# Patient Record
Sex: FEMALE | Race: WHITE | Employment: OTHER | URBAN - METROPOLITAN AREA
[De-identification: names, ages, dates, MRNs, and addresses within clinical notes are randomized per-mention and may not be internally consistent; named-entity substitution may affect disease eponyms.]

---

## 2017-02-22 ENCOUNTER — TELEPHONE (OUTPATIENT)
Dept: FAMILY MEDICINE CLINIC | Facility: CLINIC | Age: 72
End: 2017-02-22

## 2017-02-22 NOTE — TELEPHONE ENCOUNTER
LMOM for pt to call back and schedule her Medicare Annual Well Visit w/Dr Branden Jon due after 8/12/17

## 2017-04-26 ENCOUNTER — HOSPITAL ENCOUNTER (OUTPATIENT)
Age: 72
Discharge: HOME | End: 2017-04-26
Attending: FAMILY MEDICINE
Payer: MEDICARE

## 2017-04-26 VITALS
HEIGHT: 61 IN | WEIGHT: 110 LBS | DIASTOLIC BLOOD PRESSURE: 85 MMHG | RESPIRATION RATE: 16 BRPM | SYSTOLIC BLOOD PRESSURE: 134 MMHG | BODY MASS INDEX: 20.77 KG/M2 | TEMPERATURE: 98 F | HEART RATE: 92 BPM | OXYGEN SATURATION: 98 %

## 2017-04-26 DIAGNOSIS — S46.819A TRAPEZIUS MUSCLE STRAIN, UNSPECIFIED LATERALITY, INITIAL ENCOUNTER: ICD-10-CM

## 2017-04-26 DIAGNOSIS — S09.90XA MINOR HEAD INJURY, INITIAL ENCOUNTER: Primary | ICD-10-CM

## 2017-04-26 PROCEDURE — 99213 OFFICE O/P EST LOW 20 MIN: CPT

## 2017-04-26 NOTE — ED PROVIDER NOTES
Patient Seen in: [address] [facility]    History   Patient presents with:  Head Injury    Stated Complaint: head injury / fell back striking head on glass wall mirrow while exercising    HPI  68-year-old female coming in with complaints of hav Relation Age of Onset   • pancreatic ca [Other] [OTHER] Father    • ischemic stroke [Other] [OTHER] Mother    • Cancer Father      Pancreatic age 80         Smoking Status: Never Smoker                      Smokeless Status: Never Used auscultation bilaterally  CV: S1 S2 heard, no mrg   Abdomen: soft, non-tender; bowel sounds normal; no masses,  no organomegaly  Extremities: extremities normal, atraumatic, no cyanosis or edema  Pulses: 2+ and symmetric  Skin: Skin color, texture, turgor week  As needed      Medications Prescribed:  Discharge Medication List as of 4/26/2017  3:28 PM

## 2017-04-26 NOTE — ED INITIAL ASSESSMENT (HPI)
Pt states she was working out on a balance ball when her feet came out from under her, pt states she hit her head of the mirror. Denies LOC, no trauma noted, pt c/o pressure to her lower head.  No visual distubances

## 2017-05-08 ENCOUNTER — OFFICE VISIT (OUTPATIENT)
Dept: FAMILY MEDICINE CLINIC | Facility: CLINIC | Age: 72
End: 2017-05-08

## 2017-05-08 VITALS
RESPIRATION RATE: 16 BRPM | SYSTOLIC BLOOD PRESSURE: 122 MMHG | HEART RATE: 80 BPM | BODY MASS INDEX: 21.23 KG/M2 | HEIGHT: 60.75 IN | WEIGHT: 111 LBS | DIASTOLIC BLOOD PRESSURE: 70 MMHG

## 2017-05-08 DIAGNOSIS — I65.23 BILATERAL CAROTID ARTERY STENOSIS: ICD-10-CM

## 2017-05-08 DIAGNOSIS — E78.2 MIXED HYPERLIPIDEMIA: ICD-10-CM

## 2017-05-08 DIAGNOSIS — M54.50 CHRONIC BILATERAL LOW BACK PAIN WITHOUT SCIATICA: ICD-10-CM

## 2017-05-08 DIAGNOSIS — E04.1 NONTOXIC UNINODULAR GOITER: Primary | ICD-10-CM

## 2017-05-08 DIAGNOSIS — G89.29 CHRONIC BILATERAL LOW BACK PAIN WITHOUT SCIATICA: ICD-10-CM

## 2017-05-08 PROCEDURE — 99214 OFFICE O/P EST MOD 30 MIN: CPT | Performed by: FAMILY MEDICINE

## 2017-05-08 RX ORDER — LEVOTHYROXINE SODIUM 0.05 MG/1
50 TABLET ORAL DAILY
Qty: 90 TABLET | Refills: 3 | Status: SHIPPED | OUTPATIENT
Start: 2017-05-08 | End: 2018-03-28

## 2017-05-08 NOTE — PROGRESS NOTES
Patient presents with:  Low Back Pain      HPI:   This is a 66year old female coming in for back pain. Crack while doing class, hit head on mirror when back popped, no LOC, mild nexk stiffness since then.  Still some back pain    HPI     She  reports that Normal rate, regular rhythm, S1 normal, S2 normal and normal heart sounds. No murmur heard. Pulses:       Posterior tibial pulses are 2+ on the right side, and 2+ on the left side. Edema not present.   Pulmonary/Chest: Effort normal and breath sounds

## 2017-05-09 NOTE — ASSESSMENT & PLAN NOTE
Stable, Continue present management.     Thyroid  (most recent labs)     Lab Results  Component Value Date/Time   TSH 0.251* 10/07/2015 09:22 AM   T4F 1.3 10/07/2015 09:22 AM         Endocrine Medications          Levothyroxine Sodium 50 MCG Oral Tab

## 2017-07-12 ENCOUNTER — LAB ENCOUNTER (OUTPATIENT)
Dept: LAB | Age: 72
End: 2017-07-12
Attending: FAMILY MEDICINE
Payer: MEDICARE

## 2017-07-12 ENCOUNTER — HOSPITAL ENCOUNTER (OUTPATIENT)
Dept: ULTRASOUND IMAGING | Age: 72
Discharge: HOME | End: 2017-07-12
Attending: NURSE PRACTITIONER
Payer: MEDICARE

## 2017-07-12 ENCOUNTER — HOSPITAL ENCOUNTER (OUTPATIENT)
Dept: ULTRASOUND IMAGING | Age: 72
Discharge: HOME | End: 2017-07-12
Attending: FAMILY MEDICINE
Payer: MEDICARE

## 2017-07-12 DIAGNOSIS — E78.2 MIXED HYPERLIPIDEMIA: ICD-10-CM

## 2017-07-12 DIAGNOSIS — E04.1 NONTOXIC UNINODULAR GOITER: ICD-10-CM

## 2017-07-12 DIAGNOSIS — K76.0 FATTY LIVER: ICD-10-CM

## 2017-07-12 LAB
ALBUMIN SERPL-MCNC: 4.2 G/DL (ref 3.5–4.8)
ALP LIVER SERPL-CCNC: 88 U/L (ref 55–142)
ALT SERPL-CCNC: 29 U/L (ref 14–54)
AST SERPL-CCNC: 22 U/L (ref 15–41)
BASOPHILS # BLD AUTO: 0.04 X10(3) UL (ref 0–0.1)
BASOPHILS NFR BLD AUTO: 0.8 %
BILIRUB SERPL-MCNC: 0.8 MG/DL (ref 0.1–2)
BUN BLD-MCNC: 15 MG/DL (ref 8–20)
CALCIUM BLD-MCNC: 9.6 MG/DL (ref 8.3–10.3)
CHLORIDE: 106 MMOL/L (ref 101–111)
CO2: 28 MMOL/L (ref 22–32)
CREAT BLD-MCNC: 0.91 MG/DL (ref 0.55–1.02)
EOSINOPHIL # BLD AUTO: 0.19 X10(3) UL (ref 0–0.3)
EOSINOPHIL NFR BLD AUTO: 3.7 %
ERYTHROCYTE [DISTWIDTH] IN BLOOD BY AUTOMATED COUNT: 12.3 % (ref 11.5–16)
FREE T4: 1.1 NG/DL (ref 0.9–1.8)
GLUCOSE BLD-MCNC: 88 MG/DL (ref 70–99)
HCT VFR BLD AUTO: 43.7 % (ref 34–50)
HGB BLD-MCNC: 13.9 G/DL (ref 12–16)
IMMATURE GRANULOCYTE COUNT: 0.01 X10(3) UL (ref 0–1)
IMMATURE GRANULOCYTE RATIO %: 0.2 %
LYMPHOCYTES # BLD AUTO: 1.65 X10(3) UL (ref 0.9–4)
LYMPHOCYTES NFR BLD AUTO: 32 %
M PROTEIN MFR SERPL ELPH: 8.3 G/DL (ref 6.1–8.3)
MCH RBC QN AUTO: 30 PG (ref 27–33.2)
MCHC RBC AUTO-ENTMCNC: 31.8 G/DL (ref 31–37)
MCV RBC AUTO: 94.4 FL (ref 81–100)
MONOCYTES # BLD AUTO: 0.54 X10(3) UL (ref 0.1–0.6)
MONOCYTES NFR BLD AUTO: 10.5 %
NEUTROPHIL ABS PRELIM: 2.72 X10 (3) UL (ref 1.3–6.7)
NEUTROPHILS # BLD AUTO: 2.72 X10(3) UL (ref 1.3–6.7)
NEUTROPHILS NFR BLD AUTO: 52.8 %
PLATELET # BLD AUTO: 300 10(3)UL (ref 150–450)
POTASSIUM SERPL-SCNC: 4.4 MMOL/L (ref 3.6–5.1)
RBC # BLD AUTO: 4.63 X10(6)UL (ref 3.8–5.1)
RED CELL DISTRIBUTION WIDTH-SD: 42.5 FL (ref 35.1–46.3)
SODIUM SERPL-SCNC: 140 MMOL/L (ref 136–144)
TSI SER-ACNC: 1.04 MIU/ML (ref 0.35–5.5)
WBC # BLD AUTO: 5.2 X10(3) UL (ref 4–13)

## 2017-07-12 PROCEDURE — 84439 ASSAY OF FREE THYROXINE: CPT

## 2017-07-12 PROCEDURE — 76536 US EXAM OF HEAD AND NECK: CPT | Performed by: FAMILY MEDICINE

## 2017-07-12 PROCEDURE — 84443 ASSAY THYROID STIM HORMONE: CPT

## 2017-07-12 PROCEDURE — 85025 COMPLETE CBC W/AUTO DIFF WBC: CPT

## 2017-07-12 PROCEDURE — 36415 COLL VENOUS BLD VENIPUNCTURE: CPT

## 2017-07-12 PROCEDURE — 76700 US EXAM ABDOM COMPLETE: CPT | Performed by: NURSE PRACTITIONER

## 2017-07-12 PROCEDURE — 80053 COMPREHEN METABOLIC PANEL: CPT

## 2017-07-14 ENCOUNTER — TELEPHONE (OUTPATIENT)
Dept: FAMILY MEDICINE CLINIC | Facility: CLINIC | Age: 72
End: 2017-07-14

## 2017-07-14 DIAGNOSIS — N28.1 COMPLEX RENAL CYST: Primary | ICD-10-CM

## 2017-07-14 NOTE — TELEPHONE ENCOUNTER
US showed increasing renal cyst, complex, so CT to evaluate    I sent a my chart message, but please call to make sure she knows that she needs to do the CT.     Diagnoses and all orders for this visit:    Complex renal cyst  -     CT ABDOMEN (W+WO) (CPT=74

## 2017-07-15 ENCOUNTER — MED REC SCAN ONLY (OUTPATIENT)
Dept: FAMILY MEDICINE CLINIC | Facility: CLINIC | Age: 72
End: 2017-07-15

## 2017-07-17 ENCOUNTER — TELEPHONE (OUTPATIENT)
Dept: FAMILY MEDICINE CLINIC | Facility: CLINIC | Age: 72
End: 2017-07-17

## 2017-07-17 NOTE — TELEPHONE ENCOUNTER
KIDNEYS:  Bilateral renal cysts measuring up to 8 mm on the right and 3.6 cm on the left. The left renal cyst is mildly complex measuring 3.2 x 2 x 3.6 cm. It is located at the superior aspect of the left kidney.  This previously measured 2.9 x 2.6 x 2.8

## 2017-07-17 NOTE — TELEPHONE ENCOUNTER
Patient is calling she was told to have further imaging [facility] Dr. Jordyn Fung recommended that she discuss this Dr. Morse , re: cyst on left kidney. Please call to discuss.

## 2017-07-17 NOTE — TELEPHONE ENCOUNTER
Called patient and told her Dr Shane Young had already odered a CT scan for her to get done she stated she cookie do this ASAP

## 2017-07-17 NOTE — TELEPHONE ENCOUNTER
Please see telephone note from 7/14 and discontinue this as it is more completely identified at that time including the CTA ordered and the my chart message I had sent to patient through that message

## 2017-07-21 ENCOUNTER — HOSPITAL ENCOUNTER (OUTPATIENT)
Dept: CT IMAGING | Age: 72
Discharge: HOME | End: 2017-07-21
Attending: FAMILY MEDICINE
Payer: MEDICARE

## 2017-07-21 DIAGNOSIS — N28.1 COMPLEX RENAL CYST: ICD-10-CM

## 2017-07-21 PROCEDURE — 74170 CT ABD WO CNTRST FLWD CNTRST: CPT | Performed by: FAMILY MEDICINE

## 2017-07-23 PROBLEM — I70.0 ATHEROSCLEROSIS OF AORTA: Status: ACTIVE | Noted: 2017-07-23

## 2017-07-23 PROBLEM — I70.0 ATHEROSCLEROSIS OF AORTA (HCC): Status: ACTIVE | Noted: 2017-07-23

## 2017-08-14 ENCOUNTER — TELEPHONE (OUTPATIENT)
Dept: FAMILY MEDICINE CLINIC | Facility: CLINIC | Age: 72
End: 2017-08-14

## 2017-08-14 NOTE — TELEPHONE ENCOUNTER
LMOM for pt to either call back or come in 15 min prior to answer Annual Assessment form for appt on 8/18/17 w/Dr Davis (form by daily checklist)

## 2017-08-18 ENCOUNTER — OFFICE VISIT (OUTPATIENT)
Dept: FAMILY MEDICINE CLINIC | Facility: CLINIC | Age: 72
End: 2017-08-18

## 2017-08-18 VITALS
BODY MASS INDEX: 20.39 KG/M2 | HEART RATE: 68 BPM | HEIGHT: 61 IN | WEIGHT: 108 LBS | TEMPERATURE: 98 F | DIASTOLIC BLOOD PRESSURE: 68 MMHG | SYSTOLIC BLOOD PRESSURE: 114 MMHG | RESPIRATION RATE: 14 BRPM

## 2017-08-18 DIAGNOSIS — I65.23 BILATERAL CAROTID ARTERY STENOSIS: ICD-10-CM

## 2017-08-18 DIAGNOSIS — E04.1 NONTOXIC UNINODULAR GOITER: ICD-10-CM

## 2017-08-18 DIAGNOSIS — Z13.39 SCREENING FOR ALCOHOL PROBLEM: ICD-10-CM

## 2017-08-18 DIAGNOSIS — Z00.00 ANNUAL PHYSICAL EXAM: Primary | ICD-10-CM

## 2017-08-18 DIAGNOSIS — E78.2 MIXED HYPERLIPIDEMIA: ICD-10-CM

## 2017-08-18 DIAGNOSIS — Z13.31 DEPRESSION SCREENING: ICD-10-CM

## 2017-08-18 DIAGNOSIS — Z12.31 VISIT FOR SCREENING MAMMOGRAM: ICD-10-CM

## 2017-08-18 DIAGNOSIS — Z00.00 ENCOUNTER FOR ANNUAL HEALTH EXAMINATION: ICD-10-CM

## 2017-08-18 DIAGNOSIS — I70.0 ATHEROSCLEROSIS OF AORTA (HCC): ICD-10-CM

## 2017-08-18 PROCEDURE — G0444 DEPRESSION SCREEN ANNUAL: HCPCS | Performed by: FAMILY MEDICINE

## 2017-08-18 PROCEDURE — G0439 PPPS, SUBSEQ VISIT: HCPCS | Performed by: FAMILY MEDICINE

## 2017-08-18 PROCEDURE — G0442 ANNUAL ALCOHOL SCREEN 15 MIN: HCPCS | Performed by: FAMILY MEDICINE

## 2017-08-18 NOTE — PROGRESS NOTES
HPI:   Cheri Elias is a 66year old female who presents for a Medicare Subsequent Annual Wellness visit (Pt already had Initial Annual Wellness).     Rash on arms, cataracts, stable renal cyst.   Her last annual assessment has been over 1 year: Annual Phy gi endoscopy,biopsy (10/3/14); colonoscopy; and . Her family history includes Cancer in her father; ischemic stroke in her mother; pancreatic ca in her father. SOCIAL HISTORY:   She  reports that she has never smoked.  She has never used smokeless normal. Neck supple. Normal carotid pulses present. No tracheal tenderness present. No edema present. Thyroid mass and thyromegaly present. Cardiovascular: Normal rate, regular rhythm, S1 normal, S2 normal, normal heart sounds and intact distal pulses. 143, diet controlled         Current Assessment & Plan     Stable, Continue present management.       Discussed options, declined statin         Atherosclerosis of aorta (HCC)    Overview     Mild atherosclerosis of aorta seen on CT 7/2017          Current week)    How would you describe your daily physical activity?: Moderate    How would you describe your current health state?: Good    How do you maintain positive mental well-being?: Social Interaction;Puzzles; Visiting Friends; Visiting Family (aerobics) \"Cognitive Assessment\" under Medicare Assessment section in Charting, test patient and document. Then, refresh your progress note to see your input here.   Cognitive Assessment     What day of the week is this?: Correct    What month is it?: Correct are no preventive care reminders to display for this patient. Update Health Maintenance if applicable    Chlamydia  Annually if high risk No results found for: CHLAMYDIA No flowsheet data found.     Screening Mammogram      Mammogram  Annually to 76, then a LDL Cholesterol (mg/dL)   Date Value   04/02/2015 143 (H)    No flowsheet data found. Dilated Eye exam  Annually Data entered on: 11/14/2014   Last Dilated Eye Exam 11/14/2014     No flowsheet data found.     COPD      Spirometry Testing Annually Sp

## 2017-08-18 NOTE — PATIENT INSTRUCTIONS
Janet Lantigua's SCREENING SCHEDULE   Tests on this list are recommended by your physician but may not be covered, or covered at this frequency, by your insurer. Please check with your insurance carrier before scheduling to verify coverage.    PREVENTATIVE aortic aneurysm screening (once between ages 73-68) IPPE only No results found for this or any previous visit.  Limited to patients who meet one of the following criteria:   • Men who are 73-68 years old and have smoked more than 100 cigarettes in their lif Mammogram    Recommend Annually to at least age 76, and as needed after 76 Mammogram,1 Yr due on 11/21/1983 Please get this Mammogram regularly   Immunizations      Influenza  Covered Annually   Orders placed or performed in visit on 10/10/13  -Filipe Page and print using their home computer and printer. (the forms are also available in [place]  [email or web address]  This link also has information from the [facility] regarding Advance Directives.

## 2017-08-24 ENCOUNTER — HOSPITAL ENCOUNTER (OUTPATIENT)
Dept: MAMMOGRAPHY | Age: 72
Discharge: HOME | End: 2017-08-24
Attending: FAMILY MEDICINE
Payer: MEDICARE

## 2017-08-24 DIAGNOSIS — Z12.31 VISIT FOR SCREENING MAMMOGRAM: ICD-10-CM

## 2017-08-24 PROCEDURE — 77067 SCR MAMMO BI INCL CAD: CPT | Performed by: FAMILY MEDICINE

## 2017-11-29 ENCOUNTER — LAB ENCOUNTER (OUTPATIENT)
Dept: LAB | Age: 72
End: 2017-11-29
Attending: INTERNAL MEDICINE
Payer: MEDICARE

## 2017-11-29 DIAGNOSIS — K76.0 FATTY LIVER: ICD-10-CM

## 2017-11-29 PROCEDURE — 36415 COLL VENOUS BLD VENIPUNCTURE: CPT

## 2017-11-29 PROCEDURE — 80053 COMPREHEN METABOLIC PANEL: CPT

## 2017-11-29 PROCEDURE — 85025 COMPLETE CBC W/AUTO DIFF WBC: CPT

## 2017-12-06 PROBLEM — H25.813 COMBINED FORM OF AGE-RELATED CATARACT, BOTH EYES: Status: ACTIVE | Noted: 2017-12-06

## 2018-01-12 ENCOUNTER — TELEPHONE (OUTPATIENT)
Dept: FAMILY MEDICINE CLINIC | Facility: CLINIC | Age: 73
End: 2018-01-12

## 2018-01-12 RX ORDER — POLYMYXIN B SULFATE AND TRIMETHOPRIM 1; 10000 MG/ML; [USP'U]/ML
1 SOLUTION OPHTHALMIC EVERY 4 HOURS
Qty: 10 ML | Refills: 0 | Status: SHIPPED | OUTPATIENT
Start: 2018-01-12 | End: 2018-01-17

## 2018-01-12 NOTE — TELEPHONE ENCOUNTER
C/o irritation starting Wednesday in Left eye sclera was pink with drainage she had Polymyxin B-Trimethoprim 02070-9.1 UNIT/ML-% Ophthalmic Solution left over and started using it but was doing every 3 hours not every 6 and has run out she would like a ref

## 2018-01-12 NOTE — TELEPHONE ENCOUNTER
Ok,   Signed Prescriptions Disp Refills    Polymyxin B-Trimethoprim 05306-3.1 UNIT/ML-% Ophthalmic Solution 10 mL 0      Sig: Place 1 drop into both eyes every 4 (four) hours.         Authorizing Provider: Bridgette Ramires

## 2018-01-12 NOTE — TELEPHONE ENCOUNTER
Patient is in [facility] and has pink eye and needs a sciprt sent to [facility].  [facility] [place] [address], [address] [facility] [phone], [phone]

## 2018-03-21 ENCOUNTER — TELEPHONE (OUTPATIENT)
Dept: FAMILY MEDICINE CLINIC | Facility: CLINIC | Age: 73
End: 2018-03-21

## 2018-03-21 NOTE — TELEPHONE ENCOUNTER
Patient requesting a refill on Levothyroxine Sodium 50 MCG Oral Tab sent to [facility].  Patient is scheduled for 3/28/2018 with Dr. Karla Leyden

## 2018-03-21 NOTE — TELEPHONE ENCOUNTER
Should have enough medication to last until appointment was refilled in 5/8/2017 for 90 +3 RF has appointment for 3/28/2018 with Dr Lendia Halon called and talked to patient she has enough meds to last until her appointment

## 2018-03-28 ENCOUNTER — OFFICE VISIT (OUTPATIENT)
Dept: FAMILY MEDICINE CLINIC | Facility: CLINIC | Age: 73
End: 2018-03-28

## 2018-03-28 ENCOUNTER — TELEPHONE (OUTPATIENT)
Dept: FAMILY MEDICINE CLINIC | Facility: CLINIC | Age: 73
End: 2018-03-28

## 2018-03-28 VITALS
RESPIRATION RATE: 12 BRPM | HEART RATE: 64 BPM | WEIGHT: 107 LBS | HEIGHT: 62 IN | SYSTOLIC BLOOD PRESSURE: 126 MMHG | TEMPERATURE: 97 F | BODY MASS INDEX: 19.69 KG/M2 | DIASTOLIC BLOOD PRESSURE: 80 MMHG

## 2018-03-28 DIAGNOSIS — E04.1 NONTOXIC UNINODULAR GOITER: ICD-10-CM

## 2018-03-28 DIAGNOSIS — E55.9 VITAMIN D DEFICIENCY: ICD-10-CM

## 2018-03-28 DIAGNOSIS — I70.0 ATHEROSCLEROSIS OF AORTA (HCC): ICD-10-CM

## 2018-03-28 DIAGNOSIS — Z00.00 LABORATORY EXAMINATION ORDERED AS PART OF A ROUTINE GENERAL MEDICAL EXAMINATION: ICD-10-CM

## 2018-03-28 DIAGNOSIS — R73.9 HYPERGLYCEMIA: ICD-10-CM

## 2018-03-28 DIAGNOSIS — E78.2 MIXED HYPERLIPIDEMIA: Primary | ICD-10-CM

## 2018-03-28 DIAGNOSIS — Z13.220 SCREENING FOR LIPID DISORDERS: ICD-10-CM

## 2018-03-28 PROCEDURE — 99214 OFFICE O/P EST MOD 30 MIN: CPT | Performed by: FAMILY MEDICINE

## 2018-03-28 RX ORDER — LEVOTHYROXINE SODIUM 0.05 MG/1
50 TABLET ORAL DAILY
Qty: 90 TABLET | Refills: 3 | Status: SHIPPED | OUTPATIENT
Start: 2018-03-28 | End: 2018-04-18

## 2018-03-28 RX ORDER — LEVOTHYROXINE SODIUM 0.05 MG/1
50 TABLET ORAL DAILY
Qty: 90 TABLET | Refills: 3 | Status: SHIPPED | OUTPATIENT
Start: 2018-03-28 | End: 2018-03-28

## 2018-03-28 NOTE — PROGRESS NOTES
Patient presents with:  Hyperlipidemia: 6 month f/u   Thyroid Cancer: medication refill      Subjective   HPI:   This is a 74year old female coming in for cholesterol and thyroid follow up, labs due.  Lost weight in Decemebr    HPI     She  reports that sh Psychiatric: She has a normal mood and affect.  Judgment and thought content normal.        Assessment      Problem List Items Addressed This Visit        Cardiovascular    Mixed hyperlipidemia - Primary    Overview     , diet controlled         Curr

## 2018-03-28 NOTE — ASSESSMENT & PLAN NOTE
Stable, Continue present management.     Thyroid  (most recent labs)     Lab Results  Component Value Date/Time   TSH 1.040 07/12/2017 08:02 AM   T4F 1.1 07/12/2017 08:02 AM         Endocrine Medications          Levothyroxine Sodium 50 MCG Oral Tab

## 2018-04-18 ENCOUNTER — TELEPHONE (OUTPATIENT)
Dept: FAMILY MEDICINE CLINIC | Facility: CLINIC | Age: 73
End: 2018-04-18

## 2018-04-18 DIAGNOSIS — E04.1 NONTOXIC UNINODULAR GOITER: ICD-10-CM

## 2018-04-18 RX ORDER — LEVOTHYROXINE SODIUM 0.05 MG/1
50 TABLET ORAL DAILY
Qty: 90 TABLET | Refills: 3 | Status: SHIPPED | OUTPATIENT
Start: 2018-04-18 | End: 2019-04-03

## 2018-04-18 NOTE — TELEPHONE ENCOUNTER
Called and talked to patient she can get it cheaper at [address] then at the mail order med resent to [facility]

## 2018-05-10 ENCOUNTER — APPOINTMENT (OUTPATIENT)
Dept: LAB | Age: 73
End: 2018-05-10
Attending: FAMILY MEDICINE
Payer: MEDICARE

## 2018-05-10 DIAGNOSIS — Z13.220 SCREENING FOR LIPID DISORDERS: ICD-10-CM

## 2018-05-10 DIAGNOSIS — E55.9 VITAMIN D DEFICIENCY: ICD-10-CM

## 2018-05-10 DIAGNOSIS — Z00.00 LABORATORY EXAMINATION ORDERED AS PART OF A ROUTINE GENERAL MEDICAL EXAMINATION: ICD-10-CM

## 2018-05-10 DIAGNOSIS — R73.9 HYPERGLYCEMIA: ICD-10-CM

## 2018-05-10 DIAGNOSIS — E04.1 NONTOXIC UNINODULAR GOITER: ICD-10-CM

## 2018-05-10 PROCEDURE — 82306 VITAMIN D 25 HYDROXY: CPT

## 2018-05-10 PROCEDURE — 84439 ASSAY OF FREE THYROXINE: CPT

## 2018-05-10 PROCEDURE — 80053 COMPREHEN METABOLIC PANEL: CPT

## 2018-05-10 PROCEDURE — 83036 HEMOGLOBIN GLYCOSYLATED A1C: CPT

## 2018-05-10 PROCEDURE — 80061 LIPID PANEL: CPT

## 2018-05-10 PROCEDURE — 84443 ASSAY THYROID STIM HORMONE: CPT

## 2018-08-14 ENCOUNTER — TELEPHONE (OUTPATIENT)
Dept: FAMILY MEDICINE CLINIC | Facility: CLINIC | Age: 73
End: 2018-08-14

## 2018-08-14 NOTE — TELEPHONE ENCOUNTER
LM with patients  asking if he can have patient contact us to complete annual health assessment form.

## 2018-08-20 ENCOUNTER — OFFICE VISIT (OUTPATIENT)
Dept: FAMILY MEDICINE CLINIC | Facility: CLINIC | Age: 73
End: 2018-08-20
Payer: MEDICARE

## 2018-08-20 VITALS
TEMPERATURE: 98 F | RESPIRATION RATE: 12 BRPM | HEIGHT: 61 IN | DIASTOLIC BLOOD PRESSURE: 70 MMHG | SYSTOLIC BLOOD PRESSURE: 120 MMHG | WEIGHT: 104 LBS | BODY MASS INDEX: 19.63 KG/M2 | HEART RATE: 88 BPM

## 2018-08-20 DIAGNOSIS — R29.890 LOSS OF HEIGHT: ICD-10-CM

## 2018-08-20 DIAGNOSIS — I70.0 ATHEROSCLEROSIS OF AORTA (HCC): ICD-10-CM

## 2018-08-20 DIAGNOSIS — E04.1 NONTOXIC UNINODULAR GOITER: ICD-10-CM

## 2018-08-20 DIAGNOSIS — I77.9 BILATERAL CAROTID ARTERY DISEASE, UNSPECIFIED TYPE (HCC): ICD-10-CM

## 2018-08-20 DIAGNOSIS — Z00.00 ANNUAL PHYSICAL EXAM: Primary | ICD-10-CM

## 2018-08-20 DIAGNOSIS — Z12.31 VISIT FOR SCREENING MAMMOGRAM: ICD-10-CM

## 2018-08-20 DIAGNOSIS — E78.2 MIXED HYPERLIPIDEMIA: ICD-10-CM

## 2018-08-20 DIAGNOSIS — Z00.00 ENCOUNTER FOR ANNUAL HEALTH EXAMINATION: ICD-10-CM

## 2018-08-20 PROCEDURE — 99213 OFFICE O/P EST LOW 20 MIN: CPT | Performed by: FAMILY MEDICINE

## 2018-08-20 PROCEDURE — G0439 PPPS, SUBSEQ VISIT: HCPCS | Performed by: FAMILY MEDICINE

## 2018-08-20 NOTE — PROGRESS NOTES
HPI:   Yola Thompson is a 74year old female who presents for a Medicare Subsequent Annual Wellness visit (Pt already had Initial Annual Wellness). Doing well overall.    Her last annual assessment has been over 1 year: Annual Physical due on 08/18/2018 disease ([address]     Atherosclerosis of aorta ([address]     Combined form of age-related cataract, both eyes    Wt Readings from Last 3 Encounters:  08/20/18 : 104 lb  03/28/18 : 107 lb  08/18/17 : 108 lb     Last Cholesterol Labs:     Lab Results  Component Valu Negative. Negative for abdominal pain. Endocrine: Negative. Genitourinary: Negative. Negative for dysuria and difficulty urinating. Musculoskeletal: Negative for joint swelling. Skin: Negative. Negative for rash. Neurological: Negative.   Leandrew Overall murmur heard. Edema not present. Carotid bruit not present. Pulmonary/Chest: Effort normal and breath sounds normal. No respiratory distress. She has no decreased breath sounds. She has no wheezes. She has no rales. She exhibits no tenderness.    Abdomina diet controlled         Current Assessment & Plan     Stable, Continue present management. Atherosclerosis of aorta (HCC)    Overview     Mild atherosclerosis of aorta seen on CT 7/2017          Current Assessment & Plan     Stable.  Continue patient  PREVENTATIVE SERVICES  INDICATIONS AND SCHEDULE Internal Lab or Procedure External Lab or Procedure   Diabetes Screening      HbgA1C   Annually HgbA1C (%)   Date Value   05/10/2018 5.3    No flowsheet data found.     Fasting Blood Sugar (FSB)Annual Annually 11/10/2017 Please get every year    Pneumococcal 13 (Prevnar)  Covered Once after 65 08/12/2016 Please get once after your 65th birthday    Pneumococcal 23 (Pneumovax)  Covered Once after 65 10/10/2013 Please get once after your 65th birthday    H

## 2018-08-21 NOTE — ASSESSMENT & PLAN NOTE
Stable, Continue present management.     Thyroid  (most recent labs)     Lab Results  Component Value Date/Time   TSH 0.475 05/10/2018 09:11 AM   T4F 1.2 05/10/2018 09:11 AM         Endocrine Medications          Levothyroxine Sodium 50 MCG Oral Tab

## 2018-11-29 ENCOUNTER — LAB ENCOUNTER (OUTPATIENT)
Dept: LAB | Age: 73
End: 2018-11-29
Attending: INTERNAL MEDICINE
Payer: MEDICARE

## 2018-11-29 DIAGNOSIS — K76.0 FATTY LIVER: ICD-10-CM

## 2018-11-29 PROCEDURE — 80053 COMPREHEN METABOLIC PANEL: CPT

## 2018-11-29 PROCEDURE — 36415 COLL VENOUS BLD VENIPUNCTURE: CPT

## 2018-11-29 PROCEDURE — 85025 COMPLETE CBC W/AUTO DIFF WBC: CPT

## 2019-04-03 ENCOUNTER — OFFICE VISIT (OUTPATIENT)
Dept: FAMILY MEDICINE CLINIC | Facility: CLINIC | Age: 74
End: 2019-04-03
Payer: MEDICARE

## 2019-04-03 VITALS
TEMPERATURE: 97 F | RESPIRATION RATE: 14 BRPM | WEIGHT: 103.19 LBS | SYSTOLIC BLOOD PRESSURE: 122 MMHG | DIASTOLIC BLOOD PRESSURE: 70 MMHG | HEART RATE: 68 BPM | BODY MASS INDEX: 19.74 KG/M2 | HEIGHT: 60.75 IN

## 2019-04-03 DIAGNOSIS — N95.1 MENOPAUSAL SYMPTOMS: ICD-10-CM

## 2019-04-03 DIAGNOSIS — E04.1 NONTOXIC UNINODULAR GOITER: ICD-10-CM

## 2019-04-03 DIAGNOSIS — I70.0 ATHEROSCLEROSIS OF AORTA (HCC): ICD-10-CM

## 2019-04-03 DIAGNOSIS — E78.2 MIXED HYPERLIPIDEMIA: Primary | ICD-10-CM

## 2019-04-03 DIAGNOSIS — Z12.31 VISIT FOR SCREENING MAMMOGRAM: ICD-10-CM

## 2019-04-03 DIAGNOSIS — I77.9 BILATERAL CAROTID ARTERY DISEASE, UNSPECIFIED TYPE (HCC): ICD-10-CM

## 2019-04-03 DIAGNOSIS — Z00.00 LABORATORY EXAMINATION ORDERED AS PART OF A ROUTINE GENERAL MEDICAL EXAMINATION: ICD-10-CM

## 2019-04-03 DIAGNOSIS — Z78.0 ASYMPTOMATIC MENOPAUSAL STATE: ICD-10-CM

## 2019-04-03 PROCEDURE — 99213 OFFICE O/P EST LOW 20 MIN: CPT | Performed by: FAMILY MEDICINE

## 2019-04-03 RX ORDER — LEVOTHYROXINE SODIUM 0.05 MG/1
50 TABLET ORAL DAILY
Qty: 90 TABLET | Refills: 3 | Status: SHIPPED | OUTPATIENT
Start: 2019-04-03 | End: 2020-04-09

## 2019-04-03 NOTE — PATIENT INSTRUCTIONS
Stye treatment:    Baby shampoo in a leslie cup with a little warm water. Use Q-tip to apply it to the eyelid to open the pore, up to 3 times a day. Warm compresses afterwards as needed. Kisha Morel

## 2019-04-03 NOTE — PROGRESS NOTES
Patient presents with:  Thyroid Problem: medication f/u       HPI:   This is a 74year old female coming in for hypothyroidism and is here for a recheck. Patient is currently taking 50 mcg. Patient has been tolerating the medication well.  Patient was las nodules palpated  LUNGS: CTA  CARDIO: RRR without murmur  PSYCH:  Mood appropriate  EXTREMITIES: no cyanosis, clubbing or edema    ASSESSMENT AND PLAN:     Problem List Items Addressed This Visit        Cardiovascular    Mixed hyperlipidemia - Primary    R

## 2019-05-23 ENCOUNTER — HOSPITAL ENCOUNTER (OUTPATIENT)
Dept: BONE DENSITY | Age: 74
Discharge: HOME | End: 2019-05-23
Attending: FAMILY MEDICINE
Payer: MEDICARE

## 2019-05-23 DIAGNOSIS — N95.1 MENOPAUSAL SYMPTOMS: ICD-10-CM

## 2019-05-23 DIAGNOSIS — Z78.0 ASYMPTOMATIC MENOPAUSAL STATE: ICD-10-CM

## 2019-05-23 PROCEDURE — 77080 DXA BONE DENSITY AXIAL: CPT | Performed by: FAMILY MEDICINE

## 2019-05-23 NOTE — PROGRESS NOTES
Your bone density has stayed about the same. Continue your current treatment measures and repeat the Bone Density Dexa Scan in 2 years.

## 2019-06-07 PROBLEM — K63.5 POLYP OF COLON: Status: ACTIVE | Noted: 2019-06-07

## 2019-06-07 PROBLEM — D12.2 BENIGN NEOPLASM OF ASCENDING COLON: Status: ACTIVE | Noted: 2019-06-07

## 2019-06-07 PROBLEM — Z86.010 PERSONAL HISTORY OF COLONIC POLYPS: Status: ACTIVE | Noted: 2019-06-07

## 2019-06-07 PROBLEM — K64.8 OTHER HEMORRHOIDS: Status: ACTIVE | Noted: 2019-06-07

## 2019-06-07 PROBLEM — Z86.0100 PERSONAL HISTORY OF COLONIC POLYPS: Status: ACTIVE | Noted: 2019-06-07

## 2019-06-18 ENCOUNTER — TELEPHONE (OUTPATIENT)
Dept: FAMILY MEDICINE CLINIC | Facility: CLINIC | Age: 74
End: 2019-06-18

## 2019-06-18 NOTE — TELEPHONE ENCOUNTER
Received fax from [facility] for Dr. Karla Leyden. [place] [employer]

## 2019-08-09 ENCOUNTER — APPOINTMENT (OUTPATIENT)
Dept: LAB | Age: 74
End: 2019-08-09
Attending: FAMILY MEDICINE
Payer: MEDICARE

## 2019-08-09 DIAGNOSIS — Z00.00 LABORATORY EXAMINATION ORDERED AS PART OF A ROUTINE GENERAL MEDICAL EXAMINATION: ICD-10-CM

## 2019-08-09 DIAGNOSIS — E78.2 MIXED HYPERLIPIDEMIA: ICD-10-CM

## 2019-08-09 DIAGNOSIS — E04.1 NONTOXIC UNINODULAR GOITER: ICD-10-CM

## 2019-08-09 LAB
ALBUMIN SERPL-MCNC: 3.8 G/DL (ref 3.4–5)
ALBUMIN/GLOB SERPL: 1.1 {RATIO} (ref 1–2)
ALP LIVER SERPL-CCNC: 72 U/L (ref 55–142)
ALT SERPL-CCNC: 24 U/L (ref 13–56)
ANION GAP SERPL CALC-SCNC: 3 MMOL/L (ref 0–18)
AST SERPL-CCNC: 19 U/L (ref 15–37)
BILIRUB SERPL-MCNC: 0.6 MG/DL (ref 0.1–2)
BUN BLD-MCNC: 13 MG/DL (ref 7–18)
BUN/CREAT SERPL: 15.9 (ref 10–20)
CALCIUM BLD-MCNC: 9.5 MG/DL (ref 8.5–10.1)
CHLORIDE SERPL-SCNC: 107 MMOL/L (ref 98–112)
CHOLEST SMN-MCNC: 221 MG/DL (ref ?–200)
CO2 SERPL-SCNC: 29 MMOL/L (ref 21–32)
CREAT BLD-MCNC: 0.82 MG/DL (ref 0.55–1.02)
DEPRECATED RDW RBC AUTO: 41.7 FL (ref 35.1–46.3)
ERYTHROCYTE [DISTWIDTH] IN BLOOD BY AUTOMATED COUNT: 12 % (ref 11–15)
GLOBULIN PLAS-MCNC: 3.6 G/DL (ref 2.8–4.4)
GLUCOSE BLD-MCNC: 83 MG/DL (ref 70–99)
HCT VFR BLD AUTO: 42 % (ref 35–48)
HDLC SERPL-MCNC: 72 MG/DL (ref 40–59)
HGB BLD-MCNC: 13.6 G/DL (ref 12–16)
LDLC SERPL CALC-MCNC: 137 MG/DL (ref ?–100)
M PROTEIN MFR SERPL ELPH: 7.4 G/DL (ref 6.4–8.2)
MCH RBC QN AUTO: 31.1 PG (ref 26–34)
MCHC RBC AUTO-ENTMCNC: 32.4 G/DL (ref 31–37)
MCV RBC AUTO: 96.1 FL (ref 80–100)
NONHDLC SERPL-MCNC: 149 MG/DL (ref ?–130)
OSMOLALITY SERPL CALC.SUM OF ELEC: 287 MOSM/KG (ref 275–295)
PLATELET # BLD AUTO: 216 10(3)UL (ref 150–450)
POTASSIUM SERPL-SCNC: 4.3 MMOL/L (ref 3.5–5.1)
RBC # BLD AUTO: 4.37 X10(6)UL (ref 3.8–5.3)
SODIUM SERPL-SCNC: 139 MMOL/L (ref 136–145)
T4 FREE SERPL-MCNC: 1.2 NG/DL (ref 0.8–1.7)
TRIGL SERPL-MCNC: 59 MG/DL (ref 30–149)
TSI SER-ACNC: 0.65 MIU/ML (ref 0.36–3.74)
VLDLC SERPL CALC-MCNC: 12 MG/DL (ref 0–30)
WBC # BLD AUTO: 4.3 X10(3) UL (ref 4–11)

## 2019-08-09 PROCEDURE — 80053 COMPREHEN METABOLIC PANEL: CPT

## 2019-08-09 PROCEDURE — 84439 ASSAY OF FREE THYROXINE: CPT

## 2019-08-09 PROCEDURE — 85027 COMPLETE CBC AUTOMATED: CPT

## 2019-08-09 PROCEDURE — 80061 LIPID PANEL: CPT

## 2019-08-09 PROCEDURE — 84443 ASSAY THYROID STIM HORMONE: CPT

## 2019-08-12 ENCOUNTER — TELEPHONE (OUTPATIENT)
Dept: FAMILY MEDICINE CLINIC | Facility: CLINIC | Age: 74
End: 2019-08-12

## 2019-08-15 PROBLEM — D12.2 BENIGN NEOPLASM OF ASCENDING COLON: Status: RESOLVED | Noted: 2019-06-07 | Resolved: 2019-08-15

## 2019-08-15 PROBLEM — Z86.010 PERSONAL HISTORY OF COLONIC POLYPS: Status: RESOLVED | Noted: 2019-06-07 | Resolved: 2019-08-15

## 2019-08-15 PROBLEM — K63.5 POLYP OF COLON: Status: RESOLVED | Noted: 2019-06-07 | Resolved: 2019-08-15

## 2019-08-15 PROBLEM — Z86.0100 PERSONAL HISTORY OF COLONIC POLYPS: Status: RESOLVED | Noted: 2019-06-07 | Resolved: 2019-08-15

## 2019-08-15 PROBLEM — K64.8 OTHER HEMORRHOIDS: Status: RESOLVED | Noted: 2019-06-07 | Resolved: 2019-08-15

## 2019-08-16 ENCOUNTER — OFFICE VISIT (OUTPATIENT)
Dept: FAMILY MEDICINE CLINIC | Facility: CLINIC | Age: 74
End: 2019-08-16
Payer: MEDICARE

## 2019-08-16 VITALS
HEART RATE: 84 BPM | SYSTOLIC BLOOD PRESSURE: 112 MMHG | TEMPERATURE: 98 F | WEIGHT: 103 LBS | BODY MASS INDEX: 19.7 KG/M2 | RESPIRATION RATE: 16 BRPM | DIASTOLIC BLOOD PRESSURE: 60 MMHG | HEIGHT: 60.5 IN

## 2019-08-16 DIAGNOSIS — I77.9 BILATERAL CAROTID ARTERY DISEASE, UNSPECIFIED TYPE (HCC): ICD-10-CM

## 2019-08-16 DIAGNOSIS — Z00.00 ENCOUNTER FOR ANNUAL HEALTH EXAMINATION: ICD-10-CM

## 2019-08-16 DIAGNOSIS — E04.1 NONTOXIC UNINODULAR GOITER: ICD-10-CM

## 2019-08-16 DIAGNOSIS — E78.2 MIXED HYPERLIPIDEMIA: ICD-10-CM

## 2019-08-16 DIAGNOSIS — I70.0 ATHEROSCLEROSIS OF AORTA (HCC): ICD-10-CM

## 2019-08-16 DIAGNOSIS — Z00.00 ANNUAL PHYSICAL EXAM: Primary | ICD-10-CM

## 2019-08-16 PROCEDURE — G0439 PPPS, SUBSEQ VISIT: HCPCS | Performed by: FAMILY MEDICINE

## 2019-08-16 PROCEDURE — 99213 OFFICE O/P EST LOW 20 MIN: CPT | Performed by: FAMILY MEDICINE

## 2019-08-16 RX ORDER — ATORVASTATIN CALCIUM 20 MG/1
20 TABLET, FILM COATED ORAL DAILY
Qty: 90 TABLET | Refills: 3 | Status: SHIPPED | OUTPATIENT
Start: 2019-08-16 | End: 2020-08-03

## 2019-08-16 NOTE — PROGRESS NOTES
HPI:   Janel Moore is a 74year old female who presents for a Medicare Subsequent Annual Wellness visit (Pt already had Initial Annual Wellness). Complaining of enlargement in the neck area where she is had goiter before.   She notices it more when she Osteopenia     Colon polyp     Mixed hyperlipidemia     Bilateral carotid artery disease (HCC)     Atherosclerosis of aorta (HCC)     Combined form of age-related cataract, both eyes    Wt Readings from Last 3 Encounters:  08/16/19 : 103 lb  04/03/19 : 103 Eyes: Negative. Respiratory: Negative. Negative for shortness of breath. Cardiovascular: Negative. Negative for chest pain and palpitations. Gastrointestinal: Negative. Negative for abdominal pain. Endocrine: Negative.     Genitourinary: Brian Gaytan Cardiovascular: Normal rate, regular rhythm, S1 normal, S2 normal, normal heart sounds and intact distal pulses. Exam reveals no gallop, no S3, no S4 and no friction rub. No murmur heard. Edema not present. Carotid bruit not present.   Pulmonary/Ches Cardiovascular    Atherosclerosis of aorta (HCC)    Overview     Mild atherosclerosis of aorta seen on CT 7/2017          Relevant Orders    OFFICE/OUTPT VISIT,EST,LEVL III    Mixed hyperlipidemia    Overview     Therapeutic lifestyle until atorvasta Internal Lab or Procedure External Lab or Procedure   Diabetes Screening      HbgA1C   Annually HgbA1C (%)   Date Value   05/10/2018 5.3    No flowsheet data found.     Fasting Blood Sugar (FSB)Annually Glucose (mg/dL)   Date Value   08/09/2019 83     GLUCO Pneumococcal 13 (Prevnar)  Covered Once after 65 10/23/2018 Please get once after your 65th birthday    Pneumococcal 23 (Pneumovax)  Covered Once after 65 10/10/2013 Please get once after your 65th birthday    Hepatitis B for Moderate/High Risk No vacci

## 2019-08-16 NOTE — PATIENT INSTRUCTIONS
Fercho Lantigua's SCREENING SCHEDULE   Tests on this list are recommended by your physician but may not be covered, or covered at this frequency, by your insurer. Please check with your insurance carrier before scheduling to verify coverage.    PREVENTATIVE aortic aneurysm screening (once between ages 73-68) IPPE only No results found for this or any previous visit.  Limited to patients who meet one of the following criteria:   • Men who are 73-68 years old and have smoked more than 100 cigarettes in their lif Mammogram    Recommend Annually to at least age 76, and as needed after 76 There are no preventive care reminders to display for this patient.  Please get this Mammogram regularly   Immunizations      Influenza  Covered Annually Orders placed or performed i it's website for anyone to review and print using their home computer and printer. (the forms are also available in [address])  [email or web address]  This link also has information from the [facility] regarding Advance Directives.

## 2019-09-09 ENCOUNTER — HOSPITAL ENCOUNTER (OUTPATIENT)
Dept: ULTRASOUND IMAGING | Age: 74
Discharge: HOME | End: 2019-09-09
Attending: FAMILY MEDICINE
Payer: MEDICARE

## 2019-09-09 DIAGNOSIS — E04.1 NONTOXIC UNINODULAR GOITER: ICD-10-CM

## 2019-09-09 PROCEDURE — 76536 US EXAM OF HEAD AND NECK: CPT | Performed by: FAMILY MEDICINE

## 2019-10-03 ENCOUNTER — TELEPHONE (OUTPATIENT)
Dept: FAMILY MEDICINE CLINIC | Facility: CLINIC | Age: 74
End: 2019-10-03

## 2019-10-03 NOTE — TELEPHONE ENCOUNTER
Patient called back discussed making sure that she has the recalled lot.  And if she wants ca go to Pepcid as recommended by Dr Gianluca Schaefer

## 2019-12-02 NOTE — TELEPHONE ENCOUNTER
Patient was seen today and needs Levothyroxine medication sent to [facility] order instead of [address] chest pain

## 2020-03-16 ENCOUNTER — TELEPHONE (OUTPATIENT)
Dept: FAMILY MEDICINE CLINIC | Facility: CLINIC | Age: 75
End: 2020-03-16

## 2020-03-16 DIAGNOSIS — K21.9 GASTROESOPHAGEAL REFLUX DISEASE WITHOUT ESOPHAGITIS: Primary | ICD-10-CM

## 2020-03-16 NOTE — TELEPHONE ENCOUNTER
LOV 8/16/19. Future Appointments   Date Time Provider Sunday Oneillisti   4/22/2020  2:45 PM Ryanne Marie MD [facility]     Pt states that she has GERD and stopped Zantac due to recall.  She has been taking Pepcid AC before bed, but it has not bee

## 2020-03-16 NOTE — TELEPHONE ENCOUNTER
Patient has an appointment scheduled on Thursday 3/19/20 at 11 am for 6 month hyperlipidemia. LMOM explaining process.

## 2020-03-16 NOTE — TELEPHONE ENCOUNTER
Pt having issues with Daniel Moore.  Requesting to speak to a nurse and did schedule an appt on 4/22/20 with Dr Galileo Perez

## 2020-03-18 PROBLEM — K21.9 GASTROESOPHAGEAL REFLUX DISEASE WITHOUT ESOPHAGITIS: Status: ACTIVE | Noted: 2020-03-18

## 2020-03-18 NOTE — TELEPHONE ENCOUNTER
Virtual/Telephone Check-In    Jt Pollock verbally consents to a Virtual/Telephone Check-In service on 03/18/20. Patient understands and accepts financial responsibility for any deductible, co-insurance and/or co-pays associated with this service.     Ws

## 2020-04-09 DIAGNOSIS — E78.2 MIXED HYPERLIPIDEMIA: Primary | ICD-10-CM

## 2020-04-09 DIAGNOSIS — E04.1 NONTOXIC UNINODULAR GOITER: ICD-10-CM

## 2020-04-09 DIAGNOSIS — E55.9 VITAMIN D DEFICIENCY: ICD-10-CM

## 2020-04-09 DIAGNOSIS — I77.9 BILATERAL CAROTID ARTERY DISEASE, UNSPECIFIED TYPE (HCC): ICD-10-CM

## 2020-04-09 DIAGNOSIS — E83.51 HYPOCALCEMIA: ICD-10-CM

## 2020-04-09 RX ORDER — LEVOTHYROXINE SODIUM 0.05 MG/1
TABLET ORAL
Qty: 90 TABLET | Refills: 1 | Status: SHIPPED | OUTPATIENT
Start: 2020-04-09 | End: 2020-10-16

## 2020-08-03 RX ORDER — ATORVASTATIN CALCIUM 20 MG/1
TABLET, FILM COATED ORAL
Qty: 90 TABLET | Refills: 0 | Status: SHIPPED | OUTPATIENT
Start: 2020-08-03 | End: 2020-11-05

## 2020-08-03 NOTE — TELEPHONE ENCOUNTER
Requested Prescriptions     Pending Prescriptions Disp Refills   • ATORVASTATIN 20 MG Oral Tab [Pharmacy Med Name: Atorvastatin Calcium 20 MG Oral Tablet] 90 tablet 0     Sig: Take 1 tablet by mouth once daily     LOV 8/16/2019         Appointment schedule

## 2020-08-19 ENCOUNTER — LAB ENCOUNTER (OUTPATIENT)
Dept: LAB | Age: 75
End: 2020-08-19
Attending: FAMILY MEDICINE
Payer: MEDICARE

## 2020-08-19 DIAGNOSIS — I77.9 BILATERAL CAROTID ARTERY DISEASE, UNSPECIFIED TYPE (HCC): ICD-10-CM

## 2020-08-19 DIAGNOSIS — E55.9 VITAMIN D DEFICIENCY: ICD-10-CM

## 2020-08-19 DIAGNOSIS — E78.2 MIXED HYPERLIPIDEMIA: ICD-10-CM

## 2020-08-19 DIAGNOSIS — E04.1 NONTOXIC UNINODULAR GOITER: ICD-10-CM

## 2020-08-19 DIAGNOSIS — E83.51 HYPOCALCEMIA: ICD-10-CM

## 2020-08-19 LAB
ALBUMIN SERPL-MCNC: 4.1 G/DL (ref 3.4–5)
ALBUMIN/GLOB SERPL: 1.2 {RATIO} (ref 1–2)
ALP LIVER SERPL-CCNC: 67 U/L (ref 55–142)
ALT SERPL-CCNC: 27 U/L (ref 13–56)
ANION GAP SERPL CALC-SCNC: 3 MMOL/L (ref 0–18)
AST SERPL-CCNC: 20 U/L (ref 15–37)
BASOPHILS # BLD AUTO: 0.03 X10(3) UL (ref 0–0.2)
BASOPHILS NFR BLD AUTO: 0.6 %
BILIRUB SERPL-MCNC: 0.8 MG/DL (ref 0.1–2)
BUN BLD-MCNC: 14 MG/DL (ref 7–18)
BUN/CREAT SERPL: 17.7 (ref 10–20)
CALCIUM BLD-MCNC: 9.5 MG/DL (ref 8.5–10.1)
CHLORIDE SERPL-SCNC: 105 MMOL/L (ref 98–112)
CHOLEST SMN-MCNC: 175 MG/DL (ref ?–200)
CO2 SERPL-SCNC: 31 MMOL/L (ref 21–32)
CREAT BLD-MCNC: 0.79 MG/DL (ref 0.55–1.02)
DEPRECATED RDW RBC AUTO: 43.1 FL (ref 35.1–46.3)
EOSINOPHIL # BLD AUTO: 0.12 X10(3) UL (ref 0–0.7)
EOSINOPHIL NFR BLD AUTO: 2.4 %
ERYTHROCYTE [DISTWIDTH] IN BLOOD BY AUTOMATED COUNT: 11.9 % (ref 11–15)
GLOBULIN PLAS-MCNC: 3.5 G/DL (ref 2.8–4.4)
GLUCOSE BLD-MCNC: 79 MG/DL (ref 70–99)
HCT VFR BLD AUTO: 41.9 % (ref 35–48)
HDLC SERPL-MCNC: 76 MG/DL (ref 40–59)
HGB BLD-MCNC: 13.4 G/DL (ref 12–16)
IMM GRANULOCYTES # BLD AUTO: 0 X10(3) UL (ref 0–1)
IMM GRANULOCYTES NFR BLD: 0 %
LDLC SERPL CALC-MCNC: 90 MG/DL (ref ?–100)
LYMPHOCYTES # BLD AUTO: 1.63 X10(3) UL (ref 1–4)
LYMPHOCYTES NFR BLD AUTO: 32.6 %
M PROTEIN MFR SERPL ELPH: 7.6 G/DL (ref 6.4–8.2)
MCH RBC QN AUTO: 31.5 PG (ref 26–34)
MCHC RBC AUTO-ENTMCNC: 32 G/DL (ref 31–37)
MCV RBC AUTO: 98.4 FL (ref 80–100)
MONOCYTES # BLD AUTO: 0.41 X10(3) UL (ref 0.1–1)
MONOCYTES NFR BLD AUTO: 8.2 %
NEUTROPHILS # BLD AUTO: 2.81 X10 (3) UL (ref 1.5–7.7)
NEUTROPHILS # BLD AUTO: 2.81 X10(3) UL (ref 1.5–7.7)
NEUTROPHILS NFR BLD AUTO: 56.2 %
NONHDLC SERPL-MCNC: 99 MG/DL (ref ?–130)
OSMOLALITY SERPL CALC.SUM OF ELEC: 287 MOSM/KG (ref 275–295)
PATIENT FASTING Y/N/NP: YES
PATIENT FASTING Y/N/NP: YES
PLATELET # BLD AUTO: 255 10(3)UL (ref 150–450)
POTASSIUM SERPL-SCNC: 4.3 MMOL/L (ref 3.5–5.1)
RBC # BLD AUTO: 4.26 X10(6)UL (ref 3.8–5.3)
SODIUM SERPL-SCNC: 139 MMOL/L (ref 136–145)
T4 FREE SERPL-MCNC: 1.3 NG/DL (ref 0.8–1.7)
TRIGL SERPL-MCNC: 43 MG/DL (ref 30–149)
TSI SER-ACNC: 0.56 MIU/ML (ref 0.36–3.74)
VIT D+METAB SERPL-MCNC: 55.3 NG/ML (ref 30–100)
VLDLC SERPL CALC-MCNC: 9 MG/DL (ref 0–30)
WBC # BLD AUTO: 5 X10(3) UL (ref 4–11)

## 2020-08-19 PROCEDURE — 80061 LIPID PANEL: CPT

## 2020-08-19 PROCEDURE — 84439 ASSAY OF FREE THYROXINE: CPT

## 2020-08-19 PROCEDURE — 84443 ASSAY THYROID STIM HORMONE: CPT

## 2020-08-19 PROCEDURE — 82306 VITAMIN D 25 HYDROXY: CPT

## 2020-08-19 PROCEDURE — 80053 COMPREHEN METABOLIC PANEL: CPT

## 2020-08-19 PROCEDURE — 85025 COMPLETE CBC W/AUTO DIFF WBC: CPT

## 2020-08-21 ENCOUNTER — TELEPHONE (OUTPATIENT)
Dept: FAMILY MEDICINE CLINIC | Facility: CLINIC | Age: 75
End: 2020-08-21

## 2020-08-26 NOTE — ASSESSMENT & PLAN NOTE
Stable, Continue present management.     Thyroid  (most recent labs)   Lab Results   Component Value Date/Time    TSH 0.558 08/19/2020 09:36 AM    T4F 1.3 08/19/2020 09:36 AM         Endocrine Medications          LEVOTHYROXINE SODIUM 50 MCG Oral Tab

## 2020-08-27 ENCOUNTER — OFFICE VISIT (OUTPATIENT)
Dept: FAMILY MEDICINE CLINIC | Facility: CLINIC | Age: 75
End: 2020-08-27
Payer: MEDICARE

## 2020-08-27 VITALS
DIASTOLIC BLOOD PRESSURE: 68 MMHG | TEMPERATURE: 98 F | SYSTOLIC BLOOD PRESSURE: 118 MMHG | BODY MASS INDEX: 19.93 KG/M2 | HEIGHT: 60.5 IN | WEIGHT: 104.19 LBS | HEART RATE: 62 BPM | RESPIRATION RATE: 18 BRPM

## 2020-08-27 DIAGNOSIS — E04.1 NONTOXIC UNINODULAR GOITER: ICD-10-CM

## 2020-08-27 DIAGNOSIS — I70.0 ATHEROSCLEROSIS OF AORTA (HCC): ICD-10-CM

## 2020-08-27 DIAGNOSIS — Z00.00 ENCOUNTER FOR ANNUAL HEALTH EXAMINATION: ICD-10-CM

## 2020-08-27 DIAGNOSIS — E78.2 MIXED HYPERLIPIDEMIA: ICD-10-CM

## 2020-08-27 DIAGNOSIS — R21 FACIAL RASH: ICD-10-CM

## 2020-08-27 DIAGNOSIS — I77.9 BILATERAL CAROTID ARTERY DISEASE, UNSPECIFIED TYPE (HCC): ICD-10-CM

## 2020-08-27 DIAGNOSIS — N81.4 UTERINE PROLAPSE: ICD-10-CM

## 2020-08-27 DIAGNOSIS — Z00.00 ANNUAL PHYSICAL EXAM: Primary | ICD-10-CM

## 2020-08-27 PROCEDURE — G0439 PPPS, SUBSEQ VISIT: HCPCS | Performed by: FAMILY MEDICINE

## 2020-08-27 PROCEDURE — 99213 OFFICE O/P EST LOW 20 MIN: CPT | Performed by: FAMILY MEDICINE

## 2020-08-27 RX ORDER — OLOPATADINE HYDROCHLORIDE 1 MG/ML
1 SOLUTION/ DROPS OPHTHALMIC 2 TIMES DAILY
Qty: 15 ML | Refills: 3 | Status: SHIPPED | OUTPATIENT
Start: 2020-08-27

## 2020-08-27 RX ORDER — FAMOTIDINE 20 MG/1
20 TABLET ORAL 2 TIMES DAILY
COMMUNITY
End: 2020-10-07

## 2020-08-27 NOTE — PROGRESS NOTES
HPI:   Myke Alberts is a 66year old female who presents for a Medicare Subsequent Annual Wellness visit (Pt already had Initial Annual Wellness). occasiobnal neck pain, TSH is lower on 50 mcg, dexa last year. Doing well.      Bad vertigo late June, las Readings from Last 3 Encounters:  08/27/20 : 104 lb 3.2 oz (47.3 kg)  08/16/19 : 103 lb (46.7 kg)  04/03/19 : 103 lb 3.2 oz (46.8 kg)     Last Cholesterol Labs:   Lab Results   Component Value Date    CHOLEST 175 08/19/2020    HDL 76 (H) 08/19/2020    LDL Negative. Negative for chest pain and palpitations. Gastrointestinal: Negative. Negative for abdominal pain. Endocrine: Negative. Genitourinary: Negative. Negative for dysuria and difficulty urinating.    Musculoskeletal: Negative for joint swelli Exam reveals no gallop, no S3, no S4 and no friction rub. No murmur heard. Edema not present. Carotid bruit not present. Pulmonary/Chest: Effort normal and breath sounds normal. No respiratory distress. She has no decreased breath sounds.  She has no w of these issues and agrees to the plan.   Problem List Items Addressed This Visit        Cardiovascular    Atherosclerosis of aorta (HCC)    Overview     Mild atherosclerosis of aorta seen on CT 7/2017          Current Assessment & Plan     Stable continue Exercise;Daily Walks  How would you describe your daily physical activity?: Moderate  How would you describe your current health state?: Good  How do you maintain positive mental well-being?: Social Interaction; Visiting Family; Visiting Friends(gardening) if high risk No results found for: CHLAMYDIA No flowsheet data found. Screening Mammogram      Mammogram  Annually to 74, then as discussed There are no preventive care reminders to display for this patient.  Update Health Maintenance if applicable     I

## 2020-08-27 NOTE — PATIENT INSTRUCTIONS
Selena Lantigua's SCREENING SCHEDULE   Tests on this list are recommended by your physician but may not be covered, or covered at this frequency, by your insurer. Please check with your insurance carrier before scheduling to verify coverage.    PREVENTATIVE aneurysm screening (once between ages 73-68) IPPE only No results found for this or any previous visit.  Limited to patients who meet one of the following criteria:   • Men who are 73-68 years old and have smoked more than 100 cigarettes in their lifetime Recommend Annually to at least age 76, and as needed after 76 There are no preventive care reminders to display for this patient.  Please get this Mammogram regularly   Immunizations      Influenza  Covered Annually Orders placed or performed in visit on for anyone to review and print using their home computer and printer. (the forms are also available in [address])  www. [email or web address]  This link also has information from the [address] [place] [address] regarding Advance Directives.     Zyrtec 10 mg for

## 2020-10-02 ENCOUNTER — TELEPHONE (OUTPATIENT)
Dept: FAMILY MEDICINE CLINIC | Facility: CLINIC | Age: 75
End: 2020-10-02

## 2020-10-02 NOTE — TELEPHONE ENCOUNTER
Patient reports bilateral leg cramps x 4 days. Took only a half tab of atorvastatin last night and sx improved some. She has been taking the medication since last year. She asks if she can do a trial on the half dose to see if leg cramps improve?     Routed

## 2020-10-02 NOTE — TELEPHONE ENCOUNTER
Patient is calling she is having leg cramps at night. She wants to know if she can cut down her Atorvastatin. She tried to cut it in half last night but still having leg cramps.  Please call

## 2020-10-03 NOTE — TELEPHONE ENCOUNTER
Spoke with Rohan, telling her it is ok to take 1/2 tablet of Atorvastatin 20mg daily or she can take a full tablet every other day per Meche Rivas. She took only a 1/2 tablet again last evening and had no leg cramps last night.   Depending on how the lesser do

## 2020-10-12 ENCOUNTER — TELEPHONE (OUTPATIENT)
Dept: FAMILY MEDICINE CLINIC | Facility: CLINIC | Age: 75
End: 2020-10-12

## 2020-10-12 DIAGNOSIS — E78.2 MIXED HYPERLIPIDEMIA: ICD-10-CM

## 2020-10-12 DIAGNOSIS — Z01.818 PRE-OP TESTING: Primary | ICD-10-CM

## 2020-10-12 NOTE — TELEPHONE ENCOUNTER
Patient is calling she has a colonoscopy set for 11/30/20 and the gastro doctor wants an Endoscopy added to her colonoscopy and wants her to have a Covid test and an EKG done prior to her appointment. Please call patient to advise.

## 2020-10-16 ENCOUNTER — TELEPHONE (OUTPATIENT)
Dept: FAMILY MEDICINE CLINIC | Facility: CLINIC | Age: 75
End: 2020-10-16

## 2020-10-16 DIAGNOSIS — E04.1 NONTOXIC UNINODULAR GOITER: ICD-10-CM

## 2020-10-16 RX ORDER — LEVOTHYROXINE SODIUM 0.05 MG/1
50 TABLET ORAL DAILY
Qty: 90 TABLET | Refills: 1 | Status: SHIPPED | OUTPATIENT
Start: 2020-10-16 | End: 2021-03-24

## 2020-10-20 ENCOUNTER — EKG ENCOUNTER (OUTPATIENT)
Dept: LAB | Age: 75
End: 2020-10-20
Attending: FAMILY MEDICINE
Payer: MEDICARE

## 2020-10-20 DIAGNOSIS — Z01.818 PRE-OP TESTING: ICD-10-CM

## 2020-10-20 DIAGNOSIS — E78.2 MIXED HYPERLIPIDEMIA: ICD-10-CM

## 2020-10-20 PROCEDURE — 93005 ELECTROCARDIOGRAM TRACING: CPT

## 2020-10-20 PROCEDURE — 93010 ELECTROCARDIOGRAM REPORT: CPT | Performed by: INTERNAL MEDICINE

## 2020-10-27 ENCOUNTER — OFFICE VISIT (OUTPATIENT)
Dept: UROLOGY | Facility: HOSPITAL | Age: 75
End: 2020-10-27
Attending: OBSTETRICS & GYNECOLOGY
Payer: MEDICARE

## 2020-10-27 VITALS
HEIGHT: 60 IN | SYSTOLIC BLOOD PRESSURE: 102 MMHG | BODY MASS INDEX: 20.62 KG/M2 | WEIGHT: 105 LBS | DIASTOLIC BLOOD PRESSURE: 70 MMHG

## 2020-10-27 DIAGNOSIS — R39.15 URINARY URGENCY: ICD-10-CM

## 2020-10-27 DIAGNOSIS — N81.2 UTEROVAGINAL PROLAPSE, INCOMPLETE: Primary | ICD-10-CM

## 2020-10-27 DIAGNOSIS — N81.84 PELVIC MUSCLE WASTING: ICD-10-CM

## 2020-10-27 DIAGNOSIS — N81.84 PELVIC MUSCLE ATROPHY: ICD-10-CM

## 2020-10-27 DIAGNOSIS — N95.2 POSTMENOPAUSAL ATROPHIC VAGINITIS: ICD-10-CM

## 2020-10-27 PROCEDURE — 87086 URINE CULTURE/COLONY COUNT: CPT | Performed by: OBSTETRICS & GYNECOLOGY

## 2020-10-27 PROCEDURE — 81003 URINALYSIS AUTO W/O SCOPE: CPT | Performed by: OBSTETRICS & GYNECOLOGY

## 2020-10-27 PROCEDURE — 99212 OFFICE O/P EST SF 10 MIN: CPT

## 2020-10-27 RX ORDER — MULTIVIT-MIN/IRON FUM/FOLIC AC 7.5 MG-4
1 TABLET ORAL DAILY
COMMUNITY

## 2020-10-27 RX ORDER — MAGNESIUM 200 MG
TABLET ORAL
COMMUNITY

## 2020-10-27 NOTE — PROGRESS NOTES
Tana Kirkpatrick DO  10/27/2020     Referred by Dr. Chandni Hughes  Pt here with self    Patient presents with:  Prolapse: Referred by Dr Chandni Hughes UTIs past not anymore no hematuria.       HPI:  Denies KUMAR  No UUI  Nocturia x1  + prolapse sx, worsening  Not sexually acti mouth., Disp: , Rfl:     •  Calcium Carbonate-Vitamin D (CALCIUM-D) 600-400 MG-UNIT Oral Tab, Take by mouth., Disp: , Rfl:     •  Levothyroxine Sodium 50 MCG Oral Tab, Take 1 tablet (50 mcg total) by mouth daily. , Disp: 90 tablet, Rfl: 1    •  famoTIDine 2 3  Post:  2  CST:  negative  UVJ: +hypermobile    Impression/Plan:  (N81.2) Uterovaginal prolapse, incomplete  (primary encounter diagnosis)  Plan: PHYSICAL THERAPY EXTERNAL    (N81.84) Pelvic muscle atrophy  Plan: PHYSICAL THERAPY EXTERNAL    (N95.2) Post

## 2020-11-05 ENCOUNTER — TELEPHONE (OUTPATIENT)
Dept: FAMILY MEDICINE CLINIC | Facility: CLINIC | Age: 75
End: 2020-11-05

## 2020-11-05 DIAGNOSIS — I70.0 ATHEROSCLEROSIS OF AORTA (HCC): Primary | ICD-10-CM

## 2020-11-05 DIAGNOSIS — E78.2 MIXED HYPERLIPIDEMIA: ICD-10-CM

## 2020-11-05 RX ORDER — ATORVASTATIN CALCIUM 10 MG/1
10 TABLET, FILM COATED ORAL NIGHTLY
Qty: 90 TABLET | Refills: 3 | Status: SHIPPED | OUTPATIENT
Start: 2020-11-05 | End: 2021-08-25 | Stop reason: ALTCHOICE

## 2020-11-05 NOTE — TELEPHONE ENCOUNTER
Patient is calling stating Dr. Robbie Dumont had pt break Atorvastatin in half to relieve her leg cramps and pt would like to know if she can get a refill of Atorvastatin 10MG instead of the 20mg?  Please call     Preferred [facility]acy: [id number]

## 2020-11-05 NOTE — TELEPHONE ENCOUNTER
Called talked to patient went over testing by hospital then discussed other sites to get tested at she will inquire into these

## 2020-11-05 NOTE — TELEPHONE ENCOUNTER
Pt called stated she was a  for the election on Tuesday and over 400 people attended through out the day. Pt was advised to get tested for covid and pt would like to speak to nurse to go over this.  Pt wants to know how soon she can get tested and how

## 2020-11-05 NOTE — TELEPHONE ENCOUNTER
Diagnoses and all orders for this visit:    Atherosclerosis of aorta (HCC)  -     atorvastatin 10 MG Oral Tab; Take 1 tablet (10 mg total) by mouth nightly. Mixed hyperlipidemia  -     atorvastatin 10 MG Oral Tab;  Take 1 tablet (10 mg total) by mouth ni

## 2020-11-27 ENCOUNTER — APPOINTMENT (OUTPATIENT)
Dept: LAB | Age: 75
End: 2020-11-27
Attending: INTERNAL MEDICINE
Payer: MEDICARE

## 2020-11-27 DIAGNOSIS — Z01.818 PRE-OP TESTING: ICD-10-CM

## 2020-11-30 PROBLEM — R07.9 CHEST PAIN: Status: ACTIVE | Noted: 2020-11-30

## 2020-11-30 PROBLEM — K29.60 EROSIVE GASTRITIS: Status: ACTIVE | Noted: 2020-11-30

## 2020-11-30 PROBLEM — Z86.0100 PERSONAL HISTORY OF COLONIC POLYPS: Status: ACTIVE | Noted: 2020-11-30

## 2020-11-30 PROBLEM — K64.8 INTERNAL HEMORRHOIDS: Status: ACTIVE | Noted: 2020-11-30

## 2020-11-30 PROBLEM — Z86.010 PERSONAL HISTORY OF COLONIC POLYPS: Status: ACTIVE | Noted: 2020-11-30

## 2021-03-03 ENCOUNTER — TELEPHONE (OUTPATIENT)
Dept: UROLOGY | Facility: HOSPITAL | Age: 76
End: 2021-03-03

## 2021-03-03 DIAGNOSIS — N81.2 UTEROVAGINAL PROLAPSE, INCOMPLETE: Primary | ICD-10-CM

## 2021-03-03 NOTE — TELEPHONE ENCOUNTER
Calling patient to check on PT status - prepping chart for f/uappt on 3/16/21 - Prolapse symptoms have not changed - still wants to try PT  Patient recently returned from [place] - has not started PT - would like to reschedule 3/16/21 appt - will call us b

## 2021-03-22 PROBLEM — R07.9 CHEST PAIN: Status: RESOLVED | Noted: 2020-11-30 | Resolved: 2021-03-22

## 2021-03-24 ENCOUNTER — OFFICE VISIT (OUTPATIENT)
Dept: FAMILY MEDICINE CLINIC | Facility: CLINIC | Age: 76
End: 2021-03-24
Payer: MEDICARE

## 2021-03-24 VITALS
BODY MASS INDEX: 20.62 KG/M2 | DIASTOLIC BLOOD PRESSURE: 70 MMHG | WEIGHT: 105 LBS | TEMPERATURE: 97 F | RESPIRATION RATE: 17 BRPM | HEIGHT: 60 IN | SYSTOLIC BLOOD PRESSURE: 114 MMHG | HEART RATE: 82 BPM

## 2021-03-24 DIAGNOSIS — E55.9 VITAMIN D DEFICIENCY: ICD-10-CM

## 2021-03-24 DIAGNOSIS — E78.2 MIXED HYPERLIPIDEMIA: ICD-10-CM

## 2021-03-24 DIAGNOSIS — Z00.00 LABORATORY EXAMINATION ORDERED AS PART OF A ROUTINE GENERAL MEDICAL EXAMINATION: ICD-10-CM

## 2021-03-24 DIAGNOSIS — K29.60 EROSIVE GASTRITIS: ICD-10-CM

## 2021-03-24 DIAGNOSIS — E04.1 NONTOXIC UNINODULAR GOITER: ICD-10-CM

## 2021-03-24 DIAGNOSIS — I77.9 BILATERAL CAROTID ARTERY DISEASE, UNSPECIFIED TYPE (HCC): ICD-10-CM

## 2021-03-24 DIAGNOSIS — I70.0 ATHEROSCLEROSIS OF AORTA (HCC): Primary | ICD-10-CM

## 2021-03-24 PROCEDURE — 99214 OFFICE O/P EST MOD 30 MIN: CPT | Performed by: FAMILY MEDICINE

## 2021-03-24 RX ORDER — LEVOTHYROXINE SODIUM 0.05 MG/1
50 TABLET ORAL DAILY
Qty: 90 TABLET | Refills: 1 | Status: SHIPPED | OUTPATIENT
Start: 2021-03-24 | End: 2021-10-08

## 2021-03-24 NOTE — PROGRESS NOTES
Patient presents with: Follow - Up: 6 month check up   Medication Follow-Up: concerned Famotidine was upped to 40mg         HPI:   This is a 66year old female coming in for follow up meds. No new issues.      HPI     She  reports that she has never smoked Head: Normocephalic. Cardiovascular:      Rate and Rhythm: Normal rate and regular rhythm. Pulses:           Posterior tibial pulses are 2+ on the right side and 2+ on the left side. Heart sounds: Normal heart sounds. No murmur heard.      Pul Medications          Levothyroxine Sodium 50 MCG Oral Tab                 Relevant Medications    Levothyroxine Sodium 50 MCG Oral Tab    Other Relevant Orders    ASSAY, THYROID STIM HORMONE    FREE T4 (FREE THYROXINE)    CBC, PLATELET; NO DIFFERENTIAL

## 2021-04-08 ENCOUNTER — IMMUNIZATION (OUTPATIENT)
Dept: LAB | Facility: HOSPITAL | Age: 76
End: 2021-04-08
Attending: HOSPITALIST
Payer: MEDICARE

## 2021-04-08 DIAGNOSIS — Z23 NEED FOR VACCINATION: Primary | ICD-10-CM

## 2021-04-08 PROCEDURE — 0012A SARSCOV2 VAC 100MCG/0.5ML IM: CPT

## 2021-04-22 ENCOUNTER — OFFICE VISIT (OUTPATIENT)
Dept: PHYSICAL THERAPY | Age: 76
End: 2021-04-22
Attending: OBSTETRICS & GYNECOLOGY
Payer: MEDICARE

## 2021-04-22 DIAGNOSIS — N81.2 UTEROVAGINAL PROLAPSE, INCOMPLETE: ICD-10-CM

## 2021-04-22 PROCEDURE — 97110 THERAPEUTIC EXERCISES: CPT

## 2021-04-22 PROCEDURE — 97161 PT EVAL LOW COMPLEX 20 MIN: CPT

## 2021-04-22 NOTE — PROGRESS NOTES
MUSCULOSKELETAL AND PELVIC FLOOR EVALUATION:   Referring Physician: Dr. Flaquita Colindres  Diagnosis:  Uterovaginal prolapse, incomplete (N81.2 Date of Service: 4/22/2021     PATIENT SUMMARY   Mily Leach is a 66year old female  who presents to therapy today Change frequency: 0  Nocturia: 1 x night  Empty bladder just in case: sometimes  Do you ever leak urine without knowing it? no    BOWEL HABITS  Normal BM  1 x day morning. Sometimes 2 x day.  Regular intake of veggies  Do you strain with defecation: No   La Anal Sphincter: NT  Accessory Muscle Use: gluteals    Tissue Laxity Test:  Anterior Wall: Mod  Posterior Wall: WNL  Apical: Mod      Internal Palpation: WNL    Today's Treatment and Response:   Patient education was provided on objective findings of extern care.    X___________________________________________________ Date____________________    Certification From: 3/63/3412  To:7/21/2021

## 2021-04-27 ENCOUNTER — OFFICE VISIT (OUTPATIENT)
Dept: PHYSICAL THERAPY | Age: 76
End: 2021-04-27
Attending: OBSTETRICS & GYNECOLOGY
Payer: MEDICARE

## 2021-04-27 DIAGNOSIS — N81.2 UTEROVAGINAL PROLAPSE, INCOMPLETE: ICD-10-CM

## 2021-04-27 PROCEDURE — 97110 THERAPEUTIC EXERCISES: CPT

## 2021-04-27 PROCEDURE — 97112 NEUROMUSCULAR REEDUCATION: CPT

## 2021-04-27 NOTE — PROGRESS NOTES
Dx: Uterovaginal prolapse, incomplete (N81.2           Insurance (Authorized # of Visits):  8 visits           Authorizing Physician: Dr. Tonio Loera  Next MD visit: none scheduled  Fall Risk: standard         Precautions:Drug allergy         Subjective: Rocio Quach notes that she does not have pain but the uncomfort sensation of balloon and vaginal opening. Notice organ prolapse this past year. Patient reports she is not exercising at gym like she normally does due to public health safety with COVID 19.  She has been due to medical for partner. ASSESSMENT  Arsenio Cordova presents to physical therapy evaluation with primary c/o organ prolapse.  The results of the objective tests and measures show loss of pelvic support at anterior and apex, decrease PF muscle bulk, pelvic floo on pelvic anatomy and function with diagrams and pelvic model, bladder normatives, adequate hydration levels and knack/pelvic muscle brace    Patient was instructed in and issued a HEP for: diet/bladder/bowel diary     Charges: PT Eval Low Complexity, Ex2

## 2021-04-29 ENCOUNTER — APPOINTMENT (OUTPATIENT)
Dept: PHYSICAL THERAPY | Age: 76
End: 2021-04-29
Attending: OBSTETRICS & GYNECOLOGY
Payer: MEDICARE

## 2021-05-04 ENCOUNTER — OFFICE VISIT (OUTPATIENT)
Dept: PHYSICAL THERAPY | Age: 76
End: 2021-05-04
Attending: OBSTETRICS & GYNECOLOGY
Payer: MEDICARE

## 2021-05-04 DIAGNOSIS — N81.2 UTEROVAGINAL PROLAPSE, INCOMPLETE: ICD-10-CM

## 2021-05-04 PROCEDURE — 97110 THERAPEUTIC EXERCISES: CPT

## 2021-05-04 PROCEDURE — 97112 NEUROMUSCULAR REEDUCATION: CPT

## 2021-05-04 NOTE — PROGRESS NOTES
Dx: Uterovaginal prolapse, incomplete (N81.2           Insurance (Authorized # of Visits):  8 visits           Authorizing Physician: Dr. John Call  Next MD visit: none scheduled  Fall Risk: standard         Precautions:Drug allergy         Subjective: Karina Post EVALUATION:   Referring Physician: Dr. Sari Rosario  Diagnosis:  Uterovaginal prolapse, incomplete (N81.2 Date of Service: 4/22/2021     PATIENT SUMMARY   Nik Carson is a 66year old female  who presents to therapy today with complaints of organ prolapse t night  Empty bladder just in case: sometimes  Do you ever leak urine without knowing it? no    BOWEL HABITS  Normal BM  1 x day morning. Sometimes 2 x day.  Regular intake of veggies  Do you strain with defecation: No   Laxative use: No    SEXUAL HEALTH STA Muscle Use: gluteals    Tissue Laxity Test:  Anterior Wall: Mod  Posterior Wall: WNL  Apical: Mod      Internal Palpation: WNL    Today's Treatment and Response:   Patient education was provided on objective findings of external and internal evaluation and Date____________________    Certification From: 1/10/9303  To:7/21/2021

## 2021-05-06 ENCOUNTER — APPOINTMENT (OUTPATIENT)
Dept: PHYSICAL THERAPY | Age: 76
End: 2021-05-06
Attending: OBSTETRICS & GYNECOLOGY
Payer: MEDICARE

## 2021-05-11 ENCOUNTER — OFFICE VISIT (OUTPATIENT)
Dept: PHYSICAL THERAPY | Age: 76
End: 2021-05-11
Attending: OBSTETRICS & GYNECOLOGY
Payer: MEDICARE

## 2021-05-11 DIAGNOSIS — N81.2 UTEROVAGINAL PROLAPSE, INCOMPLETE: ICD-10-CM

## 2021-05-11 PROCEDURE — 97110 THERAPEUTIC EXERCISES: CPT

## 2021-05-11 PROCEDURE — 97112 NEUROMUSCULAR REEDUCATION: CPT

## 2021-05-11 NOTE — PROGRESS NOTES
Dx: Uterovaginal prolapse, incomplete (N81.2           Insurance (Authorized # of Visits):  8 visits           Authorizing Physician: Dr. Clements Led  Next MD visit: none scheduled  Fall Risk: standard         Precautions:Drug allergy         Subjective: Hannah Manuel legs only  X 10  No double hop/aggravated back     HEP review  HEP instruction/issue HO       HEP: 4/27/2021 PF lorenzo, PF pre-activation : BKFO, Table top and UE raises    Charges: NMRED2 ( 35min) Ex 1 ( 8 min        Total Timed Treatment: 43 min  Total Edwin with full night sleep of bladder. Patient is up once a night to void.  voiding frequently with prostate CA and wakes patient. Will 1 x.    Fluid intake 2 - 24 oz. 1/2 cup coffee  Abdominal/Vaginal Pressure complaints: perineal  Urinary Frequency: q 2- WNL  Introitus: WNL  Perineal body: WNL    Sensory/Reflex:  Vestibule: normal bilaterally  Anal [place]: normal bilaterally    Internal Examination   Scar: NA    Pelvic Floor Muscle strength: (PERF= Power/Endurance/Reps/Fast) MMT: 3 no lift/10/5/10 fast in 10 contact me at Dept: [phone]    Sincerely,  Electronically signed by therapist: Yancy Lucia, PT  [de-identified] certification required: Yes  I certify the need for these services furnished under this plan of treatment and while under my care.     X_______

## 2021-05-13 ENCOUNTER — APPOINTMENT (OUTPATIENT)
Dept: PHYSICAL THERAPY | Age: 76
End: 2021-05-13
Attending: OBSTETRICS & GYNECOLOGY
Payer: MEDICARE

## 2021-05-18 ENCOUNTER — APPOINTMENT (OUTPATIENT)
Dept: PHYSICAL THERAPY | Age: 76
End: 2021-05-18
Attending: OBSTETRICS & GYNECOLOGY
Payer: MEDICARE

## 2021-05-20 ENCOUNTER — OFFICE VISIT (OUTPATIENT)
Dept: PHYSICAL THERAPY | Age: 76
End: 2021-05-20
Attending: OBSTETRICS & GYNECOLOGY
Payer: MEDICARE

## 2021-05-20 PROCEDURE — 97112 NEUROMUSCULAR REEDUCATION: CPT

## 2021-05-20 PROCEDURE — 97110 THERAPEUTIC EXERCISES: CPT

## 2021-05-20 NOTE — PROGRESS NOTES
Discharge Summary    Pt has attended 5, cancelled 0, and no shown 0 visits in Physical Therapy. Subjective:  Patient reports working on HEP daily. Patient has no symptoms of prolapse. She notices prolapse is preset with hygiene.  Patient is able to a and return this letter via fax as soon as possible to [phone]. I certify the need for these services furnished under this plan of treatment and while under my care.     X___________________________________________________ Date____________________

## 2021-06-08 ENCOUNTER — OFFICE VISIT (OUTPATIENT)
Dept: UROLOGY | Facility: HOSPITAL | Age: 76
End: 2021-06-08
Attending: OBSTETRICS & GYNECOLOGY
Payer: MEDICARE

## 2021-06-08 VITALS — BODY MASS INDEX: 20.62 KG/M2 | TEMPERATURE: 98 F | WEIGHT: 105 LBS | HEIGHT: 60 IN

## 2021-06-08 DIAGNOSIS — N95.2 POSTMENOPAUSAL ATROPHIC VAGINITIS: ICD-10-CM

## 2021-06-08 DIAGNOSIS — N81.2 UTEROVAGINAL PROLAPSE, INCOMPLETE: Primary | ICD-10-CM

## 2021-06-08 DIAGNOSIS — N81.84 PELVIC MUSCLE ATROPHY: ICD-10-CM

## 2021-06-08 DIAGNOSIS — N81.84 PELVIC MUSCLE WASTING: ICD-10-CM

## 2021-06-08 PROCEDURE — 99212 OFFICE O/P EST SF 10 MIN: CPT

## 2021-06-08 RX ORDER — ESTRADIOL 0.1 MG/G
CREAM VAGINAL
Qty: 43 G | Refills: 3 | Status: SHIPPED | OUTPATIENT
Start: 2021-06-08 | End: 2021-08-25 | Stop reason: ALTCHOICE

## 2021-06-08 RX ORDER — FAMOTIDINE 20 MG/1
20 TABLET ORAL NIGHTLY PRN
COMMUNITY

## 2021-06-08 NOTE — PROGRESS NOTES
Patient presents to follow up bulge    She is currently using pelvic floor PT    She reports + improvement   Worry about bulge worsening  No UTIs  Bowels reg  Not using vag estrogen    Temp 97.7 °F (36.5 °C)   Ht 60\"   Wt 105 lb (47.6 kg)   BMI 20.51 kg/m

## 2021-07-26 ENCOUNTER — TELEPHONE (OUTPATIENT)
Dept: FAMILY MEDICINE CLINIC | Facility: CLINIC | Age: 76
End: 2021-07-26

## 2021-07-26 DIAGNOSIS — E04.1 NONTOXIC UNINODULAR GOITER: ICD-10-CM

## 2021-07-26 DIAGNOSIS — E83.51 HYPOCALCEMIA: ICD-10-CM

## 2021-07-26 DIAGNOSIS — E78.2 MIXED HYPERLIPIDEMIA: ICD-10-CM

## 2021-07-26 DIAGNOSIS — Z00.00 LABORATORY EXAMINATION ORDERED AS PART OF A ROUTINE GENERAL MEDICAL EXAMINATION: ICD-10-CM

## 2021-07-26 DIAGNOSIS — E55.9 VITAMIN D DEFICIENCY: Primary | ICD-10-CM

## 2021-07-26 NOTE — TELEPHONE ENCOUNTER
Please enter lab orders for the patient's upcoming physical appointment. Physical scheduled:    Your appointments     Date & Time Appointment Department [facility])    Aug 25, 2021  2:30 PM CDT Medicare Annual Well Visit with Hussain Loja MD [facility]

## 2021-07-26 NOTE — TELEPHONE ENCOUNTER
1. Vitamin D deficiency (Primary)  Overview:  OTC supplmentation  Orders:  -     Vitamin D, 25-Hydroxy; Future; Expected date: 07/26/2021  2. Hypocalcemia  -     Vitamin D, 25-Hydroxy; Future; Expected date: 07/26/2021  3.  Nontoxic uninodular goiter  Overv

## 2021-08-19 ENCOUNTER — TELEPHONE (OUTPATIENT)
Dept: FAMILY MEDICINE CLINIC | Facility: CLINIC | Age: 76
End: 2021-08-19

## 2021-08-24 NOTE — ASSESSMENT & PLAN NOTE
Stable, continue present management Last Dexa Scan: XR DEXA BONE DENSITOMETRY (CPT=77080) 05/23/2019

## 2021-08-25 ENCOUNTER — OFFICE VISIT (OUTPATIENT)
Dept: FAMILY MEDICINE CLINIC | Facility: CLINIC | Age: 76
End: 2021-08-25
Payer: MEDICARE

## 2021-08-25 VITALS
WEIGHT: 103.19 LBS | RESPIRATION RATE: 14 BRPM | HEART RATE: 86 BPM | SYSTOLIC BLOOD PRESSURE: 130 MMHG | DIASTOLIC BLOOD PRESSURE: 66 MMHG | HEIGHT: 60.5 IN | BODY MASS INDEX: 19.74 KG/M2

## 2021-08-25 DIAGNOSIS — Z00.00 ANNUAL PHYSICAL EXAM: Primary | ICD-10-CM

## 2021-08-25 DIAGNOSIS — I70.0 ATHEROSCLEROSIS OF AORTA (HCC): ICD-10-CM

## 2021-08-25 DIAGNOSIS — Z78.0 ASYMPTOMATIC MENOPAUSAL STATE: ICD-10-CM

## 2021-08-25 DIAGNOSIS — E04.1 NONTOXIC UNINODULAR GOITER: ICD-10-CM

## 2021-08-25 DIAGNOSIS — E46 PROTEIN-CALORIE MALNUTRITION, UNSPECIFIED SEVERITY (HCC): ICD-10-CM

## 2021-08-25 DIAGNOSIS — Z00.00 ENCOUNTER FOR ANNUAL HEALTH EXAMINATION: ICD-10-CM

## 2021-08-25 DIAGNOSIS — M85.89 OSTEOPENIA OF MULTIPLE SITES: ICD-10-CM

## 2021-08-25 DIAGNOSIS — E78.2 MIXED HYPERLIPIDEMIA: ICD-10-CM

## 2021-08-25 PROCEDURE — 99214 OFFICE O/P EST MOD 30 MIN: CPT | Performed by: FAMILY MEDICINE

## 2021-08-25 PROCEDURE — G0439 PPPS, SUBSEQ VISIT: HCPCS | Performed by: FAMILY MEDICINE

## 2021-08-25 NOTE — PROGRESS NOTES
HPI:   Nathan Cloud is a 66year old female who presents for a Medicare Subsequent Annual Wellness visit (Pt already had Initial Annual Wellness). Doing well overall, stable labs.  Weight low but good nutrionton  Annual Physical due on 08/27/2021 colonic polyps     Internal hemorrhoids     Protein-calorie malnutrition, unspecified severity ([facility] [address]    Wt Readings from Last 3 Encounters:  08/25/21 : 103 lb 3.2 oz (46.8 kg)  06/08/21 : 105 lb (47.6 kg)  03/24/21 : 105 lb (47.6 kg)     Last Cholesterol L pancreatic ca in her father. SOCIAL HISTORY:   She  reports that she has never smoked. She has never used smokeless tobacco. She reports current alcohol use of about 1.0 - 2.0 standard drinks of alcohol per week. She reports that she does not use drugs. Movements: Extraocular movements intact. Pupils: Pupils are equal, round, and reactive to light. Cardiovascular:      Rate and Rhythm: Normal rate and regular rhythm. Pulses: Normal pulses.            Carotid pulses are 2+ on the right side and Vaccine Live (Zostavax) 02/19/2014   • Zoster Vaccine Recombinant Adjuvanted (Shingrix) 08/01/2018, 11/01/2018        ASSESSMENT AND OTHER RELEVANT CHRONIC CONDITIONS:   Janel Moore is a 66year old female who presents for a Medicare Assessment.      PLAN atorvastatin and estradiol. I am also having her maintain her diphenhydrAMINE 25mg, Olopatadine HCl, Multi-Vitamin/Minerals, Co Q 10, Magnesium, Calcium-D, levothyroxine, and famotidine. Return in 6 months (on 2/25/2022).      Kori Cui MD, 8/25/2021 factors   • Men who are 73-68 years old and have ever smoked   • Anyone with a family history -     Colorectal Cancer Screening  Covered for ages 52-80; only need ONE of the following:    Colonoscopy   Covered every 10 years    Covered every 2 years if pat 02/19/2014  No recommendations at this time

## 2021-08-25 NOTE — PATIENT INSTRUCTIONS
Farhat Lantigua's SCREENING SCHEDULE   Tests on this list are recommended by your physician but may not be covered, or covered at this frequency, by your insurer. Please check with your insurance carrier before scheduling to verify coverage.    PREVENTATI 05/23/2019      No recommendations at this time   Pap and Pelvic    Pap   Covered every 2 years for women at normal risk;  Annually if at high risk -  No recommendations at this time    Chlamydia Annually if high risk -  No recommendations at this time   Sc Advance Directives.

## 2021-09-09 ENCOUNTER — TELEPHONE (OUTPATIENT)
Dept: FAMILY MEDICINE CLINIC | Facility: CLINIC | Age: 76
End: 2021-09-09

## 2021-09-09 DIAGNOSIS — E78.2 MIXED HYPERLIPIDEMIA: Primary | ICD-10-CM

## 2021-09-09 RX ORDER — PRAVASTATIN SODIUM 10 MG
10 TABLET ORAL NIGHTLY
Qty: 90 TABLET | Refills: 3 | Status: SHIPPED | OUTPATIENT
Start: 2021-09-09

## 2021-09-09 NOTE — TELEPHONE ENCOUNTER
Pt states she was in on 08/25/21 and she had discusses with Dr. Rain Burks about taking new cholesterol medication instead of the atorvastatin.  Pt states she called her pharmacy to check has the medication been sent but she did not know the name Dr. Rain Burks was sup

## 2021-09-09 NOTE — TELEPHONE ENCOUNTER
1. Mixed hyperlipidemia (Primary)  Overview:  Therapeutic lifestyle until atorvastatin 20 started 8/16/2019   Orders:  -     Pravastatin Sodium; Take 1 tablet (10 mg total) by mouth nightly. Dispense: 90 tablet;  Refill: 3     Sent to [facility], ok to noify

## 2021-09-16 ENCOUNTER — APPOINTMENT (OUTPATIENT)
Dept: ULTRASOUND IMAGING | Age: 76
End: 2021-09-16
Attending: FAMILY MEDICINE
Payer: MEDICARE

## 2021-09-16 ENCOUNTER — HOSPITAL ENCOUNTER (OUTPATIENT)
Dept: BONE DENSITY | Age: 76
Discharge: HOME | End: 2021-09-16
Attending: FAMILY MEDICINE
Payer: MEDICARE

## 2021-09-16 DIAGNOSIS — Z78.0 ASYMPTOMATIC MENOPAUSAL STATE: ICD-10-CM

## 2021-09-16 DIAGNOSIS — M85.89 OSTEOPENIA OF MULTIPLE SITES: ICD-10-CM

## 2021-09-16 PROCEDURE — 77080 DXA BONE DENSITY AXIAL: CPT | Performed by: FAMILY MEDICINE

## 2021-09-27 ENCOUNTER — LAB ENCOUNTER (OUTPATIENT)
Dept: LAB | Age: 76
End: 2021-09-27
Attending: FAMILY MEDICINE
Payer: MEDICARE

## 2021-09-27 DIAGNOSIS — Z00.00 LABORATORY EXAMINATION ORDERED AS PART OF A ROUTINE GENERAL MEDICAL EXAMINATION: ICD-10-CM

## 2021-09-27 DIAGNOSIS — E55.9 VITAMIN D DEFICIENCY: ICD-10-CM

## 2021-09-27 DIAGNOSIS — E78.2 MIXED HYPERLIPIDEMIA: ICD-10-CM

## 2021-09-27 DIAGNOSIS — E83.51 HYPOCALCEMIA: ICD-10-CM

## 2021-09-27 DIAGNOSIS — I77.9 BILATERAL CAROTID ARTERY DISEASE, UNSPECIFIED TYPE (HCC): ICD-10-CM

## 2021-09-27 DIAGNOSIS — E04.1 NONTOXIC UNINODULAR GOITER: ICD-10-CM

## 2021-09-27 DIAGNOSIS — K29.60 EROSIVE GASTRITIS: ICD-10-CM

## 2021-09-27 PROCEDURE — 84439 ASSAY OF FREE THYROXINE: CPT

## 2021-09-27 PROCEDURE — 84443 ASSAY THYROID STIM HORMONE: CPT

## 2021-09-27 PROCEDURE — 85027 COMPLETE CBC AUTOMATED: CPT

## 2021-09-27 PROCEDURE — 80061 LIPID PANEL: CPT

## 2021-09-27 PROCEDURE — 82306 VITAMIN D 25 HYDROXY: CPT

## 2021-09-27 PROCEDURE — 36415 COLL VENOUS BLD VENIPUNCTURE: CPT

## 2021-09-27 PROCEDURE — 80053 COMPREHEN METABOLIC PANEL: CPT

## 2021-10-06 ENCOUNTER — HOSPITAL ENCOUNTER (OUTPATIENT)
Dept: ULTRASOUND IMAGING | Age: 76
Discharge: HOME | End: 2021-10-06
Attending: FAMILY MEDICINE
Payer: MEDICARE

## 2021-10-06 DIAGNOSIS — E04.1 NONTOXIC UNINODULAR GOITER: ICD-10-CM

## 2021-10-06 PROCEDURE — 76536 US EXAM OF HEAD AND NECK: CPT | Performed by: FAMILY MEDICINE

## 2021-10-08 RX ORDER — LEVOTHYROXINE SODIUM 50 UG/1
TABLET ORAL
Qty: 90 TABLET | Refills: 0 | Status: SHIPPED | OUTPATIENT
Start: 2021-10-08 | End: 2021-12-27

## 2021-10-08 NOTE — TELEPHONE ENCOUNTER
Requested Prescriptions     Pending Prescriptions Disp Refills   • EUTHYROX 48 MCG Oral Tab [Pharmacy Med Name: Euthyrox 50 MCG Oral Tablet] 90 tablet 0     Sig: Take 1 tablet by mouth once daily     LOV 8/25/2021     Patient was asked to follow-up in: 6 m

## 2021-12-24 DIAGNOSIS — E04.1 NONTOXIC UNINODULAR GOITER: ICD-10-CM

## 2021-12-27 RX ORDER — LEVOTHYROXINE SODIUM 50 UG/1
TABLET ORAL
Qty: 90 TABLET | Refills: 0 | Status: SHIPPED | OUTPATIENT
Start: 2021-12-27

## 2021-12-27 NOTE — TELEPHONE ENCOUNTER
Requested Prescriptions     Pending Prescriptions Disp Refills   • EUTHYROX 48 MCG Oral Tab [Pharmacy Med Name: Euthyrox 50 MCG Oral Tablet] 90 tablet 0     Sig: Take 1 tablet by mouth once daily     LOV 8/25/2021  Labs completed 9/27/2021  TSH 0.485    Pa

## 2022-04-08 ENCOUNTER — TELEPHONE (OUTPATIENT)
Dept: FAMILY MEDICINE CLINIC | Facility: CLINIC | Age: 77
End: 2022-04-08

## 2022-04-08 ENCOUNTER — OFFICE VISIT (OUTPATIENT)
Dept: FAMILY MEDICINE CLINIC | Facility: CLINIC | Age: 77
End: 2022-04-08
Payer: MEDICARE

## 2022-04-08 VITALS
DIASTOLIC BLOOD PRESSURE: 76 MMHG | BODY MASS INDEX: 20.62 KG/M2 | WEIGHT: 107.81 LBS | HEART RATE: 88 BPM | RESPIRATION RATE: 18 BRPM | SYSTOLIC BLOOD PRESSURE: 120 MMHG | HEIGHT: 60.5 IN

## 2022-04-08 DIAGNOSIS — E78.2 MIXED HYPERLIPIDEMIA: Primary | ICD-10-CM

## 2022-04-08 DIAGNOSIS — E46 PROTEIN-CALORIE MALNUTRITION, UNSPECIFIED SEVERITY (HCC): ICD-10-CM

## 2022-04-08 DIAGNOSIS — I70.0 ATHEROSCLEROSIS OF AORTA (HCC): ICD-10-CM

## 2022-04-08 DIAGNOSIS — R23.9 SKIN CHANGE: ICD-10-CM

## 2022-04-08 DIAGNOSIS — E04.1 NONTOXIC UNINODULAR GOITER: ICD-10-CM

## 2022-04-08 DIAGNOSIS — H35.30 MACULAR DEGENERATION OF RIGHT EYE, UNSPECIFIED TYPE: ICD-10-CM

## 2022-04-08 DIAGNOSIS — I77.9 BILATERAL CAROTID ARTERY DISEASE, UNSPECIFIED TYPE (HCC): ICD-10-CM

## 2022-04-08 PROCEDURE — 99214 OFFICE O/P EST MOD 30 MIN: CPT | Performed by: FAMILY MEDICINE

## 2022-04-08 RX ORDER — AZELASTINE 1 MG/ML
1 SPRAY, METERED NASAL 2 TIMES DAILY
Qty: 3 EACH | Refills: 3 | Status: SHIPPED | OUTPATIENT
Start: 2022-04-08

## 2022-04-08 RX ORDER — LEVOTHYROXINE SODIUM 50 UG/1
50 TABLET ORAL DAILY
Qty: 90 TABLET | Refills: 3 | Status: SHIPPED | OUTPATIENT
Start: 2022-04-08

## 2022-04-08 RX ORDER — PRAVASTATIN SODIUM 10 MG
10 TABLET ORAL NIGHTLY
Qty: 90 TABLET | Refills: 3 | Status: SHIPPED | OUTPATIENT
Start: 2022-04-08

## 2022-04-08 NOTE — ASSESSMENT & PLAN NOTE
Stable, Continue present management.     Cholesterol Lowering Medications          pravastatin 10 MG Oral Tab

## 2022-04-08 NOTE — ASSESSMENT & PLAN NOTE
Stable, Continue present management.     Thyroid  (most recent labs)   Lab Results   Component Value Date/Time    TSH 0.485 09/27/2021 09:06 AM    T4F 1.2 09/27/2021 09:06 AM         Endocrine Medications          EUTHYROX 50 MCG Oral Tab

## 2022-04-08 NOTE — TELEPHONE ENCOUNTER
Please enter lab orders for the patient's upcoming physical appointment. Physical scheduled: Your appointments     Date & Time Appointment Department [facility])    Oct 06, 2022 10:00 AM CDT Medicare Annual Well Visit with MD Ila Erickson [address], [address], [place]  ([address])            [address], [address], [place] [address] [place] [address] [phone]         [facility])     The patient has been notified to complete fasting labs prior to their physical appointment.

## 2022-06-07 ENCOUNTER — OFFICE VISIT (OUTPATIENT)
Dept: UROLOGY | Facility: CLINIC | Age: 77
End: 2022-06-07
Attending: OBSTETRICS & GYNECOLOGY
Payer: MEDICARE

## 2022-06-07 VITALS — TEMPERATURE: 98 F | WEIGHT: 107 LBS | HEIGHT: 60.5 IN | BODY MASS INDEX: 20.46 KG/M2

## 2022-06-07 DIAGNOSIS — N81.84 PELVIC MUSCLE WASTING: Primary | ICD-10-CM

## 2022-06-07 DIAGNOSIS — N81.2 UTEROVAGINAL PROLAPSE, INCOMPLETE: ICD-10-CM

## 2022-06-07 DIAGNOSIS — N95.2 POSTMENOPAUSAL ATROPHIC VAGINITIS: ICD-10-CM

## 2022-06-07 PROCEDURE — 99212 OFFICE O/P EST SF 10 MIN: CPT

## 2022-06-07 RX ORDER — ESTRADIOL 0.1 MG/G
CREAM VAGINAL
Qty: 42 G | Refills: 3 | Status: SHIPPED | OUTPATIENT
Start: 2022-06-07

## 2022-10-03 ENCOUNTER — TELEPHONE (OUTPATIENT)
Dept: FAMILY MEDICINE CLINIC | Facility: CLINIC | Age: 77
End: 2022-10-03

## 2022-10-03 NOTE — TELEPHONE ENCOUNTER
Unable to [facility] for patient her mailbox is full for [facility] and appt 10/6/22 with Dr. Tremaine Delgado.

## 2022-10-06 ENCOUNTER — OFFICE VISIT (OUTPATIENT)
Dept: FAMILY MEDICINE CLINIC | Facility: CLINIC | Age: 77
End: 2022-10-06
Payer: MEDICARE

## 2022-10-06 VITALS
SYSTOLIC BLOOD PRESSURE: 134 MMHG | RESPIRATION RATE: 18 BRPM | BODY MASS INDEX: 19.82 KG/M2 | DIASTOLIC BLOOD PRESSURE: 70 MMHG | WEIGHT: 103.63 LBS | HEIGHT: 60.5 IN | HEART RATE: 72 BPM

## 2022-10-06 DIAGNOSIS — E78.2 MIXED HYPERLIPIDEMIA: ICD-10-CM

## 2022-10-06 DIAGNOSIS — Z00.00 LABORATORY EXAMINATION ORDERED AS PART OF A ROUTINE GENERAL MEDICAL EXAMINATION: ICD-10-CM

## 2022-10-06 DIAGNOSIS — R73.9 HYPERGLYCEMIA: ICD-10-CM

## 2022-10-06 DIAGNOSIS — I70.0 ATHEROSCLEROSIS OF AORTA (HCC): ICD-10-CM

## 2022-10-06 DIAGNOSIS — I77.9 BILATERAL CAROTID ARTERY DISEASE, UNSPECIFIED TYPE (HCC): ICD-10-CM

## 2022-10-06 DIAGNOSIS — Z00.00 ANNUAL PHYSICAL EXAM: Primary | ICD-10-CM

## 2022-10-06 DIAGNOSIS — Z00.00 ENCOUNTER FOR ANNUAL HEALTH EXAMINATION: ICD-10-CM

## 2022-10-06 DIAGNOSIS — M85.89 OSTEOPENIA OF MULTIPLE SITES: ICD-10-CM

## 2022-10-06 DIAGNOSIS — E46 PROTEIN-CALORIE MALNUTRITION, UNSPECIFIED SEVERITY (HCC): ICD-10-CM

## 2022-10-06 DIAGNOSIS — K21.9 GASTROESOPHAGEAL REFLUX DISEASE WITHOUT ESOPHAGITIS: ICD-10-CM

## 2022-10-06 DIAGNOSIS — E04.1 NONTOXIC UNINODULAR GOITER: ICD-10-CM

## 2022-10-06 DIAGNOSIS — E55.9 VITAMIN D DEFICIENCY: ICD-10-CM

## 2022-10-06 RX ORDER — BETAMETHASONE DIPROPIONATE 0.5 MG/G
CREAM TOPICAL
Qty: 15 G | Refills: 1 | Status: SHIPPED | OUTPATIENT
Start: 2022-10-06

## 2022-10-06 NOTE — ASSESSMENT & PLAN NOTE
Last Dexa Scan: XR DEXA BONE DENSITOMETRY (CPT=77080) 09/16/2021   stable, continue present management

## 2022-10-21 ENCOUNTER — LAB ENCOUNTER (OUTPATIENT)
Dept: LAB | Age: 77
End: 2022-10-21
Attending: FAMILY MEDICINE
Payer: MEDICARE

## 2022-10-21 DIAGNOSIS — E46 PROTEIN-CALORIE MALNUTRITION, UNSPECIFIED SEVERITY (HCC): ICD-10-CM

## 2022-10-21 DIAGNOSIS — E04.1 NONTOXIC UNINODULAR GOITER: ICD-10-CM

## 2022-10-21 DIAGNOSIS — E78.2 MIXED HYPERLIPIDEMIA: ICD-10-CM

## 2022-10-21 DIAGNOSIS — Z00.00 LABORATORY EXAMINATION ORDERED AS PART OF A ROUTINE GENERAL MEDICAL EXAMINATION: ICD-10-CM

## 2022-10-21 DIAGNOSIS — R73.9 HYPERGLYCEMIA: ICD-10-CM

## 2022-10-21 LAB
ALBUMIN SERPL-MCNC: 3.7 G/DL (ref 3.4–5)
ALBUMIN/GLOB SERPL: 0.9 {RATIO} (ref 1–2)
ALP LIVER SERPL-CCNC: 87 U/L
ALT SERPL-CCNC: 27 U/L
ANION GAP SERPL CALC-SCNC: 3 MMOL/L (ref 0–18)
AST SERPL-CCNC: 22 U/L (ref 15–37)
BILIRUB SERPL-MCNC: 0.6 MG/DL (ref 0.1–2)
BUN BLD-MCNC: 15 MG/DL (ref 7–18)
CALCIUM BLD-MCNC: 9.4 MG/DL (ref 8.5–10.1)
CHLORIDE SERPL-SCNC: 108 MMOL/L (ref 98–112)
CHOLEST SERPL-MCNC: 202 MG/DL (ref ?–200)
CO2 SERPL-SCNC: 30 MMOL/L (ref 21–32)
CREAT BLD-MCNC: 0.75 MG/DL
ERYTHROCYTE [DISTWIDTH] IN BLOOD BY AUTOMATED COUNT: 12.3 %
EST. AVERAGE GLUCOSE BLD GHB EST-MCNC: 111 MG/DL (ref 68–126)
FASTING PATIENT LIPID ANSWER: YES
FASTING STATUS PATIENT QL REPORTED: YES
GFR SERPLBLD BASED ON 1.73 SQ M-ARVRAT: 81 ML/MIN/1.73M2 (ref 60–?)
GLOBULIN PLAS-MCNC: 4.1 G/DL (ref 2.8–4.4)
GLUCOSE BLD-MCNC: 95 MG/DL (ref 70–99)
HBA1C MFR BLD: 5.5 % (ref ?–5.7)
HCT VFR BLD AUTO: 41.7 %
HDLC SERPL-MCNC: 77 MG/DL (ref 40–59)
HGB BLD-MCNC: 13.6 G/DL
LDLC SERPL CALC-MCNC: 115 MG/DL (ref ?–100)
MCH RBC QN AUTO: 32.1 PG (ref 26–34)
MCHC RBC AUTO-ENTMCNC: 32.6 G/DL (ref 31–37)
MCV RBC AUTO: 98.3 FL
NONHDLC SERPL-MCNC: 125 MG/DL (ref ?–130)
OSMOLALITY SERPL CALC.SUM OF ELEC: 293 MOSM/KG (ref 275–295)
PLATELET # BLD AUTO: 225 10(3)UL (ref 150–450)
POTASSIUM SERPL-SCNC: 4.2 MMOL/L (ref 3.5–5.1)
PROT SERPL-MCNC: 7.8 G/DL (ref 6.4–8.2)
RBC # BLD AUTO: 4.24 X10(6)UL
SODIUM SERPL-SCNC: 141 MMOL/L (ref 136–145)
T4 FREE SERPL-MCNC: 1.2 NG/DL (ref 0.8–1.7)
TRIGL SERPL-MCNC: 57 MG/DL (ref 30–149)
TSI SER-ACNC: 0.77 MIU/ML (ref 0.36–3.74)
VLDLC SERPL CALC-MCNC: 10 MG/DL (ref 0–30)
WBC # BLD AUTO: 5 X10(3) UL (ref 4–11)

## 2022-10-21 PROCEDURE — 80061 LIPID PANEL: CPT

## 2022-10-21 PROCEDURE — 80053 COMPREHEN METABOLIC PANEL: CPT

## 2022-10-21 PROCEDURE — 84443 ASSAY THYROID STIM HORMONE: CPT

## 2022-10-21 PROCEDURE — 36415 COLL VENOUS BLD VENIPUNCTURE: CPT

## 2022-10-21 PROCEDURE — 84439 ASSAY OF FREE THYROXINE: CPT

## 2022-10-21 PROCEDURE — 85027 COMPLETE CBC AUTOMATED: CPT

## 2022-10-21 PROCEDURE — 83036 HEMOGLOBIN GLYCOSYLATED A1C: CPT

## 2023-03-24 DIAGNOSIS — E04.1 NONTOXIC UNINODULAR GOITER: ICD-10-CM

## 2023-03-24 RX ORDER — LEVOTHYROXINE SODIUM 50 UG/1
TABLET ORAL
Qty: 90 TABLET | Refills: 0 | Status: SHIPPED | OUTPATIENT
Start: 2023-03-24

## 2023-04-13 ENCOUNTER — OFFICE VISIT (OUTPATIENT)
Dept: FAMILY MEDICINE CLINIC | Facility: CLINIC | Age: 78
End: 2023-04-13
Payer: MEDICARE

## 2023-04-13 VITALS
WEIGHT: 102.38 LBS | DIASTOLIC BLOOD PRESSURE: 72 MMHG | SYSTOLIC BLOOD PRESSURE: 124 MMHG | HEART RATE: 76 BPM | RESPIRATION RATE: 16 BRPM | HEIGHT: 60 IN | BODY MASS INDEX: 20.1 KG/M2

## 2023-04-13 DIAGNOSIS — I70.0 ATHEROSCLEROSIS OF AORTA (HCC): ICD-10-CM

## 2023-04-13 DIAGNOSIS — N81.4 CYSTOCELE WITH PROLAPSE: ICD-10-CM

## 2023-04-13 DIAGNOSIS — E78.2 MIXED HYPERLIPIDEMIA: Primary | ICD-10-CM

## 2023-04-13 DIAGNOSIS — E46 PROTEIN-CALORIE MALNUTRITION, UNSPECIFIED SEVERITY (HCC): ICD-10-CM

## 2023-04-13 DIAGNOSIS — E04.1 NONTOXIC UNINODULAR GOITER: ICD-10-CM

## 2023-04-13 DIAGNOSIS — I77.9 BILATERAL CAROTID ARTERY DISEASE, UNSPECIFIED TYPE (HCC): ICD-10-CM

## 2023-04-13 PROCEDURE — 99214 OFFICE O/P EST MOD 30 MIN: CPT | Performed by: FAMILY MEDICINE

## 2023-04-13 RX ORDER — PRAVASTATIN SODIUM 10 MG
10 TABLET ORAL NIGHTLY
Qty: 90 TABLET | Refills: 3 | Status: SHIPPED | OUTPATIENT
Start: 2023-04-13

## 2023-04-13 NOTE — ASSESSMENT & PLAN NOTE
Thyroid shows Good control. TSH: 0.773, done on 10/21/2022. FT4: 1.2, done on 10/21/2022. Thyroid therapy includes EUTHYROX 50 MCG Oral Tab [id number].

## 2023-04-13 NOTE — ASSESSMENT & PLAN NOTE
Cholesterol shows Good control. Cholesterol: 202, done on 10/21/2022. HDL Cholesterol: 77, done on 10/21/2022. TriGlycerides 57, done on 10/21/2022. LDL Cholesterol: 115, done on 10/21/2022. Cholesterol medications include pravastatin 10 MG Oral Tab [[id number]].

## 2023-05-06 ENCOUNTER — HOSPITAL ENCOUNTER (OUTPATIENT)
Age: 78
Discharge: HOME | End: 2023-05-06
Payer: MEDICARE

## 2023-05-06 VITALS
TEMPERATURE: 97 F | DIASTOLIC BLOOD PRESSURE: 90 MMHG | OXYGEN SATURATION: 98 % | WEIGHT: 101 LBS | RESPIRATION RATE: 20 BRPM | HEIGHT: 61 IN | HEART RATE: 81 BPM | SYSTOLIC BLOOD PRESSURE: 122 MMHG | BODY MASS INDEX: 19.07 KG/M2

## 2023-05-06 DIAGNOSIS — L03.114 CELLULITIS OF LEFT UPPER EXTREMITY: Primary | ICD-10-CM

## 2023-05-06 PROCEDURE — 99203 OFFICE O/P NEW LOW 30 MIN: CPT | Performed by: NURSE PRACTITIONER

## 2023-05-06 RX ORDER — CEPHALEXIN 500 MG/1
500 CAPSULE ORAL 2 TIMES DAILY
Qty: 14 CAPSULE | Refills: 0 | Status: SHIPPED | OUTPATIENT
Start: 2023-05-06 | End: 2023-05-13

## 2023-05-06 NOTE — ED INITIAL ASSESSMENT (HPI)
Skin biopsy to left hand Wednesday. Yesterday had some redness. Today it has spread and pt is having some local pain.

## 2023-05-31 ENCOUNTER — TELEPHONE (OUTPATIENT)
Dept: UROLOGY | Facility: CLINIC | Age: 78
End: 2023-05-31

## 2023-06-02 ENCOUNTER — MOBILE ENCOUNTER (OUTPATIENT)
Dept: FAMILY MEDICINE CLINIC | Facility: CLINIC | Age: 78
End: 2023-06-02

## 2023-06-02 RX ORDER — POLYMYXIN B SULFATE AND TRIMETHOPRIM 1; 10000 MG/ML; [USP'U]/ML
1 SOLUTION OPHTHALMIC EVERY 4 HOURS
Qty: 1 EACH | Refills: 0 | Status: SHIPPED | OUTPATIENT
Start: 2023-06-02

## 2023-06-06 ENCOUNTER — OFFICE VISIT (OUTPATIENT)
Dept: UROLOGY | Facility: CLINIC | Age: 78
End: 2023-06-06
Attending: OBSTETRICS & GYNECOLOGY
Payer: MEDICARE

## 2023-06-06 VITALS — WEIGHT: 98.63 LBS | TEMPERATURE: 98 F | RESPIRATION RATE: 16 BRPM | BODY MASS INDEX: 18.62 KG/M2 | HEIGHT: 61 IN

## 2023-06-06 DIAGNOSIS — N81.2 UTEROVAGINAL PROLAPSE, INCOMPLETE: Primary | ICD-10-CM

## 2023-06-06 DIAGNOSIS — N95.2 POSTMENOPAUSAL ATROPHIC VAGINITIS: ICD-10-CM

## 2023-06-06 PROCEDURE — 99212 OFFICE O/P EST SF 10 MIN: CPT

## 2023-06-22 ENCOUNTER — HOSPITAL ENCOUNTER (OUTPATIENT)
Dept: ULTRASOUND IMAGING | Age: 78
Discharge: HOME | End: 2023-06-22
Attending: OTOLARYNGOLOGY
Payer: MEDICARE

## 2023-06-22 DIAGNOSIS — E04.1 THYROID NODULE: ICD-10-CM

## 2023-06-22 PROCEDURE — 76536 US EXAM OF HEAD AND NECK: CPT | Performed by: OTOLARYNGOLOGY

## 2023-07-12 ENCOUNTER — HOSPITAL ENCOUNTER (OUTPATIENT)
Dept: ULTRASOUND IMAGING | Facility: HOSPITAL | Age: 78
Discharge: HOME | End: 2023-07-12
Attending: OTOLARYNGOLOGY
Payer: MEDICARE

## 2023-07-12 DIAGNOSIS — E04.1 THYROID NODULE: ICD-10-CM

## 2023-07-12 PROCEDURE — 10005 FNA BX W/US GDN 1ST LES: CPT | Performed by: OTOLARYNGOLOGY

## 2023-07-12 PROCEDURE — 88173 CYTOPATH EVAL FNA REPORT: CPT | Performed by: PHYSICIAN ASSISTANT

## 2023-07-12 PROCEDURE — 10006 FNA BX W/US GDN EA ADDL: CPT | Performed by: OTOLARYNGOLOGY

## 2023-07-17 NOTE — TELEPHONE ENCOUNTER
Diagnoses and all orders for this visit:    Mixed hyperlipidemia  -     COMP METABOLIC PANEL (14); Future  -     LIPID PANEL; Future    Nontoxic uninodular goiter  -     LEVOTHYROXINE SODIUM 50 MCG Oral Tab;  Take 1 tablet by mouth once daily  -     TSH+ROBERT
Refilled Nilam's levothyroxine. 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 Would you like to order labs?     Routed to [facility]
Requested Prescriptions     Pending Prescriptions Disp Refills   • LEVOTHYROXINE SODIUM 50 MCG Oral Tab [Pharmacy Med Name: Levothyroxine Sodium 50 MCG Oral Tablet] 90 tablet 0     Sig: Take 1 tablet by mouth once daily     LOV 8/16/2019     Patient was as
None

## 2023-07-18 DIAGNOSIS — E04.1 NONTOXIC UNINODULAR GOITER: ICD-10-CM

## 2023-07-20 RX ORDER — LEVOTHYROXINE SODIUM 50 UG/1
TABLET ORAL
Qty: 90 TABLET | Refills: 0 | Status: SHIPPED | OUTPATIENT
Start: 2023-07-20

## 2023-07-20 NOTE — TELEPHONE ENCOUNTER
Requested Prescriptions     Pending Prescriptions Disp Refills    EUTHYROX 48 MCG Oral Tab [Pharmacy Med Name: EUTHYROX 0.05MG (50MCG) TABLETS] 90 tablet 0     Sig: TAKE 1 TABLET(50 MCG) BY MOUTH DAILY     LOV 4/13/2023     Patient was asked to follow-up in: 6 months    Appointment scheduled: 10/13/2023 Isabel Tracy MD     Medication refilled per protocol.

## 2023-09-28 DIAGNOSIS — E04.1 NONTOXIC UNINODULAR GOITER: ICD-10-CM

## 2023-10-02 ENCOUNTER — TELEPHONE (OUTPATIENT)
Dept: FAMILY MEDICINE CLINIC | Facility: CLINIC | Age: 78
End: 2023-10-02

## 2023-10-02 DIAGNOSIS — Z13.1 SCREENING FOR DIABETES MELLITUS: Primary | ICD-10-CM

## 2023-10-02 DIAGNOSIS — E78.5 DYSLIPIDEMIA: ICD-10-CM

## 2023-10-02 DIAGNOSIS — Z13.0 SCREENING FOR IRON DEFICIENCY ANEMIA: ICD-10-CM

## 2023-10-02 DIAGNOSIS — Z13.29 SCREENING FOR THYROID DISORDER: ICD-10-CM

## 2023-10-02 NOTE — TELEPHONE ENCOUNTER
Please enter lab orders for the patient's upcoming physical appointment. Physical scheduled: Your appointments       Date & Time Appointment Department [facility])    Oct 13, 2023  2:30 PM CDT Medicare Annual Well Visit with Chucho Carranza MD [address], [address], [place] ([address])              [address], [address], [place] [address] [place] [address] [phone]           Preferred lab: [facility])     The patient has been notified to complete fasting labs prior to their physical appointment.

## 2023-10-03 RX ORDER — LEVOTHYROXINE SODIUM 0.05 MG/1
50 TABLET ORAL DAILY
Qty: 90 TABLET | Refills: 0 | Status: SHIPPED | OUTPATIENT
Start: 2023-10-03

## 2023-10-03 NOTE — TELEPHONE ENCOUNTER
Requested Prescriptions     Pending Prescriptions Disp Refills    LEVOTHYROXINE 50 MCG Oral Tab [Pharmacy Med Name: LEVOTHYROXINE 0.05MG (50MCG) TAB] 90 tablet 0     Sig: TAKE 1 TABLET(50 MCG) BY MOUTH DAILY     LOV 4/13/2023     Patient was asked to follow-up in: 6 months    Appointment scheduled: 10/13/2023 Cailin Gil MD     Medication refilled per protocol.

## 2023-10-06 ENCOUNTER — TELEMEDICINE (OUTPATIENT)
Dept: FAMILY MEDICINE CLINIC | Facility: CLINIC | Age: 78
End: 2023-10-06
Payer: MEDICARE

## 2023-10-06 DIAGNOSIS — U07.1 COVID-19 VIRUS INFECTION: Primary | ICD-10-CM

## 2023-10-06 PROCEDURE — 99213 OFFICE O/P EST LOW 20 MIN: CPT | Performed by: NURSE PRACTITIONER

## 2023-10-06 NOTE — PROGRESS NOTES
Patient presents with:  Covid: Positive test symptoms are getting better but  just tested positive      This visit was conducted using Telemedicine with live, two-way interactive video and audio. Patient understands and accepts financial responsibility for any deductible, co-insurance and/or co-pays associated with this service. HPI:  Presents for follow up of positive COVID test on 10/5 with symptoms starting on 10/2 consisting of dry cough, SOB, sinus congestion, fevers. Reports is feeling significant improvement today, no fevers since yesterday morning. Reports cough is improving as well. Denies SOB, VERMA, headaches, N/V/D, loss of smell/taste, sore throat, sinus congestion, ear pain/pressure, dizziness, lightheadedness, body aches or fatigue. Has not been treating with acetaminophen with some relief. Has completed 5 doses of COVID vaccine series. Review of Systems   As noted in HPI    Past Medical History:   Diagnosis Date    Arthritis     Chest pain     Easy bruising     Esophageal reflux     Hemorrhoids     Hyperlipidemia     Thyroid disease     Wears glasses        Patient Active Problem List:     Hypocalcemia     Vitamin D deficiency     Nontoxic uninodular goiter     Lumbago     Osteopenia     Colon polyp     Mixed hyperlipidemia     Bilateral carotid artery disease (HCC)     Atherosclerosis of aorta (HCC)     Combined form of age-related cataract, both eyes     Gastroesophageal reflux disease without esophagitis     Antral gastritis     Personal history of colonic polyps     Internal hemorrhoids     Protein-calorie malnutrition, unspecified severity (HCC)      Current Outpatient Medications   Medication Sig Dispense Refill    LEVOTHYROXINE 50 MCG Oral Tab TAKE 1 TABLET(50 MCG) BY MOUTH DAILY 90 tablet 0    polymyxin B-trimethoprim 29176-4.1 UNIT/ML-% Ophthalmic Solution Place 1 drop into both eyes every 4 (four) hours.  1 each 0    pravastatin 10 MG Oral Tab Take 1 tablet (10 mg total) by mouth nightly. 90 tablet 3    estradiol (ESTRACE) 0.1 MG/GM Vaginal Cream 1/2 gram twice weekly per vagina 42 g 3    Azelastine HCl 0.1 % Nasal Solution 1 spray by Nasal route 2 (two) times daily. 3 each 3    famoTIDine 20 MG Oral Tab Take 1 tablet (20 mg total) by mouth nightly as needed for Heartburn. Coenzyme Q10 (CO Q 10) 100 MG Oral Cap Take by mouth. Magnesium 200 MG Oral Tab Take by mouth. Calcium Carbonate-Vitamin D (CALCIUM-D) 600-400 MG-UNIT Oral Tab Take by mouth. Olopatadine HCl 0.1 % Ophthalmic Solution Place 1 drop into both eyes 2 (two) times daily. 15 mL 3    betamethasone dipropionate 0.05 % External Cream Apply topically sparingly to affected area twice a day. (Patient not taking: Reported on 10/6/2023) 15 g 1    Multiple Vitamins-Minerals (MULTI-VITAMIN/MINERALS) Oral Tab Take 1 tablet by mouth daily. (Patient not taking: Reported on 10/6/2023)      diphenhydrAMINE 25mg   (Patient not taking: Reported on 10/6/2023)         Physical Exam  General:  Sitting calmly on screen, non-toxic appearing, no apparent distress. Neuro: AOx3 on video, answering questions and interacting appropriately. Resp: breathing non-labored, speaking in full sentences. No need to pause speaking to take breaths, no deep/exaggerated breaths or coughing noted during conversation. Skin: warm and pink w/o flushing or obvious diaphoresis     Psych: pleasant and cooperative on video, speech pattern normal.     A/P:    Covid-19 virus infection  (primary encounter diagnosis)-  Discussed timetable for recovery from COVID is variable. Discussed since patient is vaccinated would expect more mild case and she is indeed already feeling better. Discussed oral medications approved  for COVID and patient meets criteria but does not want to take it. Discussed management is symptom control and quarantine as below. Discussed should continue to get rest and drink fluids.  May use acetaminophen or ibuprofen as needed for body aches, headaches or any fevers. Follow  instructions for dose/time intervals. Should continue to quarantine at home and self-isolate away from household contacts for 5 days after symptom onset (with first day of symptoms being day 0) AND until she has been 72 hours without a fever- without antipyretic medications. Wear a mask for an additional 5 days (after quarantine time) if she must be around non-COVID infected household contacts or go out in public. Instructed can use a commercially available pulse oximeter to check O2 levels. If readings <91% for prolonged period (3 minutes at rest) should present to the ER. ER warnings also given for any SOB, VERMA, dizziness, lethargy, chest pain/tightness or any other concerning symptoms. Patient verbalized understanding and is agreeable to this plan of care. Total visit time was 21 minutes, including 17 minutes of face to face visit time and 4 minutes of documentation and chart review. No orders of the defined types were placed in this encounter. Meds & Refills for this Visit:  Requested Prescriptions      No prescriptions requested or ordered in this encounter       Imaging & Consults:  None    No follow-ups on file. There are no Patient Instructions on file for this visit. All questions were answered and the patient understands the plan.

## 2023-10-13 ENCOUNTER — OFFICE VISIT (OUTPATIENT)
Dept: FAMILY MEDICINE CLINIC | Facility: CLINIC | Age: 78
End: 2023-10-13
Payer: MEDICARE

## 2023-10-13 VITALS
WEIGHT: 101 LBS | HEIGHT: 61 IN | HEART RATE: 74 BPM | BODY MASS INDEX: 19.07 KG/M2 | RESPIRATION RATE: 16 BRPM | SYSTOLIC BLOOD PRESSURE: 132 MMHG | DIASTOLIC BLOOD PRESSURE: 70 MMHG

## 2023-10-13 DIAGNOSIS — Z00.00 ANNUAL PHYSICAL EXAM: Primary | ICD-10-CM

## 2023-10-13 DIAGNOSIS — I77.9 BILATERAL CAROTID ARTERY DISEASE, UNSPECIFIED TYPE (HCC): ICD-10-CM

## 2023-10-13 DIAGNOSIS — E78.2 MIXED HYPERLIPIDEMIA: ICD-10-CM

## 2023-10-13 DIAGNOSIS — Z00.00 ENCOUNTER FOR ANNUAL HEALTH EXAMINATION: ICD-10-CM

## 2023-10-13 DIAGNOSIS — K21.9 GASTROESOPHAGEAL REFLUX DISEASE WITHOUT ESOPHAGITIS: ICD-10-CM

## 2023-10-13 DIAGNOSIS — I70.0 ATHEROSCLEROSIS OF AORTA (HCC): ICD-10-CM

## 2023-10-13 DIAGNOSIS — M85.89 OSTEOPENIA OF MULTIPLE SITES: ICD-10-CM

## 2023-10-13 DIAGNOSIS — E46 PROTEIN-CALORIE MALNUTRITION, UNSPECIFIED SEVERITY (HCC): ICD-10-CM

## 2023-10-13 DIAGNOSIS — Z78.0 ASYMPTOMATIC MENOPAUSAL STATE: ICD-10-CM

## 2023-10-13 DIAGNOSIS — E04.1 NONTOXIC UNINODULAR GOITER: ICD-10-CM

## 2023-10-13 PROCEDURE — 99214 OFFICE O/P EST MOD 30 MIN: CPT | Performed by: FAMILY MEDICINE

## 2023-10-13 PROCEDURE — G0439 PPPS, SUBSEQ VISIT: HCPCS | Performed by: FAMILY MEDICINE

## 2023-10-13 RX ORDER — GLUCOSAMINE/CHONDR SU A SOD 750-600 MG
TABLET ORAL
COMMUNITY
Start: 2022-04-01

## 2023-10-13 RX ORDER — BACLOFEN 10 MG/1
10 TABLET ORAL 3 TIMES DAILY PRN
Qty: 30 TABLET | Refills: 0 | Status: SHIPPED | OUTPATIENT
Start: 2023-10-13 | End: 2023-11-12

## 2023-10-13 NOTE — ASSESSMENT & PLAN NOTE
Physical Therapy Visit    Visit Type: Daily Treatment Note  Visit: 9  Referring Provider: Rayshawn Garza MD  Medical Diagnosis (from order): Diagnosis Information    Diagnosis  V54.81 (ICD-9-CM) - Z47.1 (ICD-10-CM) - Aftercare following joint replacement         SUBJECTIVE                                                                                                               Patient reports that the weather has her knee feeling a little more stiff today. Also reports that she is experiencing left plantar fasciitis.       OBJECTIVE                                                                                                                     Range of Motion (ROM)   (degrees unless noted; active unless noted; norms in ( ); negative=lacking to 0, positive=beyond 0)  Knee:   - Flexion (150):      • Right:  114                        Treatment     Therapeutic Activity  Bicycle - 5 minutes, warming up knee flexion  Supine assisted flexion AROM - 6 x 30 seconds  Hip abduction - blue theraband x 30  Hip extension - blue theraband x 30  Side stepping over 6\" maddie x 20  Forward stepping over 6\" maddei x 20      Neuromuscular Re-Education  Straight leg raise - 30  Airex tandem balance - 3 x 30 second  Ambulating 145 feet at a time x 6, cues for reducing right trendelenberg, stopping after 145 in order to maintain quality of ambulation    Skilled input: verbal instruction/cues, inhibition, tactile instruction/cues, demonstration, posture correction and facilitation    Writer verbally educated and received verbal consent for hand placement, positioning of patient, and techniques to be performed today from patient for hand placement and palpation for techniques, clothing adjustments for techniques and therapist position for techniques as described above and how they are pertinent to the patient's plan of care.        ASSESSMENT                                                                                         Thyroid shows Good control. TSH: 0.773, done on 10/21/2022. FT4: 1.2, done on 10/21/2022. Thyroid therapy includes LEVOTHYROXINE 50 MCG Oral Tab [[id number]].                     Patient exhibits increased flexion range of motion, and improved proprioception during activities. Patient continues to fatigue quickly and exhibit decreased stance time and increased trendelenberg when fatigued. Patient will benefit from continued therapy to improve strength, and endurance in order to return to work safely.   Education:   - Results of above outlined education: Verbalizes understanding, Demonstrates understanding and Needs reinforcement    PLAN                                                                                                                           Suggestions for next session as indicated: Progress per plan of care        I was present and supervised the graduate therapist treating under a temporary license. All documentation was reviewed and care agreed upon.  Real Carlson, PT        Therapy procedure time and total treatment time can be found documented on the Time Entry flowsheet

## 2023-10-13 NOTE — ASSESSMENT & PLAN NOTE
Cholesterol shows Good control. Long term heart-healthy diet and lifestyle discussed and encouraged to reduce risk of cardiovascular disease. Cholesterol: 202, done on 10/21/2022. HDL Cholesterol: 77, done on 10/21/2022. TriGlycerides 57, done on 10/21/2022. LDL Cholesterol: 115, done on 10/21/2022. Cholesterol medications include pravastatin 10 MG Oral Tab [[id number]].

## 2023-10-13 NOTE — ASSESSMENT & PLAN NOTE
Stable, continue present management Last Dexa Scan:    XR DEXA BONE DENSITOMETRY (CPT=77080) 09/16/2021

## 2023-12-26 DIAGNOSIS — E04.1 NONTOXIC UNINODULAR GOITER: ICD-10-CM

## 2023-12-26 NOTE — TELEPHONE ENCOUNTER
Levothyroxine fails protocol because last thyroid labs were done 10/21/22. Spoke to pt and she will have labs done tomorrow.

## 2023-12-27 ENCOUNTER — LAB ENCOUNTER (OUTPATIENT)
Dept: LAB | Age: 78
End: 2023-12-27
Attending: PHYSICIAN ASSISTANT
Payer: MEDICARE

## 2023-12-27 DIAGNOSIS — Z13.1 SCREENING FOR DIABETES MELLITUS: ICD-10-CM

## 2023-12-27 DIAGNOSIS — Z13.0 SCREENING FOR IRON DEFICIENCY ANEMIA: ICD-10-CM

## 2023-12-27 DIAGNOSIS — E78.5 DYSLIPIDEMIA: ICD-10-CM

## 2023-12-27 DIAGNOSIS — Z13.29 SCREENING FOR THYROID DISORDER: ICD-10-CM

## 2023-12-27 LAB
ALBUMIN SERPL-MCNC: 3.9 G/DL (ref 3.4–5)
ALBUMIN/GLOB SERPL: 1 {RATIO} (ref 1–2)
ALP LIVER SERPL-CCNC: 84 U/L
ALT SERPL-CCNC: 23 U/L
ANION GAP SERPL CALC-SCNC: 6 MMOL/L (ref 0–18)
AST SERPL-CCNC: 19 U/L (ref 15–37)
BASOPHILS # BLD AUTO: 0.03 X10(3) UL (ref 0–0.2)
BASOPHILS NFR BLD AUTO: 0.6 %
BILIRUB SERPL-MCNC: 0.7 MG/DL (ref 0.1–2)
BUN BLD-MCNC: 16 MG/DL (ref 9–23)
CALCIUM BLD-MCNC: 9.4 MG/DL (ref 8.5–10.1)
CHLORIDE SERPL-SCNC: 106 MMOL/L (ref 98–112)
CO2 SERPL-SCNC: 29 MMOL/L (ref 21–32)
CREAT BLD-MCNC: 0.77 MG/DL
EGFRCR SERPLBLD CKD-EPI 2021: 78 ML/MIN/1.73M2 (ref 60–?)
EOSINOPHIL # BLD AUTO: 0.2 X10(3) UL (ref 0–0.7)
EOSINOPHIL NFR BLD AUTO: 4 %
ERYTHROCYTE [DISTWIDTH] IN BLOOD BY AUTOMATED COUNT: 12.5 %
FASTING STATUS PATIENT QL REPORTED: YES
GLOBULIN PLAS-MCNC: 3.8 G/DL (ref 2.8–4.4)
GLUCOSE BLD-MCNC: 88 MG/DL (ref 70–99)
HCT VFR BLD AUTO: 43 %
HGB BLD-MCNC: 13.7 G/DL
IMM GRANULOCYTES # BLD AUTO: 0.02 X10(3) UL (ref 0–1)
IMM GRANULOCYTES NFR BLD: 0.4 %
LYMPHOCYTES # BLD AUTO: 1.77 X10(3) UL (ref 1–4)
LYMPHOCYTES NFR BLD AUTO: 35.4 %
MCH RBC QN AUTO: 30.9 PG (ref 26–34)
MCHC RBC AUTO-ENTMCNC: 31.9 G/DL (ref 31–37)
MCV RBC AUTO: 97.1 FL
MONOCYTES # BLD AUTO: 0.45 X10(3) UL (ref 0.1–1)
MONOCYTES NFR BLD AUTO: 9 %
NEUTROPHILS # BLD AUTO: 2.53 X10 (3) UL (ref 1.5–7.7)
NEUTROPHILS # BLD AUTO: 2.53 X10(3) UL (ref 1.5–7.7)
NEUTROPHILS NFR BLD AUTO: 50.6 %
OSMOLALITY SERPL CALC.SUM OF ELEC: 293 MOSM/KG (ref 275–295)
PLATELET # BLD AUTO: 264 10(3)UL (ref 150–450)
POTASSIUM SERPL-SCNC: 4.3 MMOL/L (ref 3.5–5.1)
PROT SERPL-MCNC: 7.7 G/DL (ref 6.4–8.2)
RBC # BLD AUTO: 4.43 X10(6)UL
SODIUM SERPL-SCNC: 141 MMOL/L (ref 136–145)
TSI SER-ACNC: 0.72 MIU/ML (ref 0.36–3.74)
WBC # BLD AUTO: 5 X10(3) UL (ref 4–11)

## 2023-12-27 PROCEDURE — 84443 ASSAY THYROID STIM HORMONE: CPT

## 2023-12-27 PROCEDURE — 85025 COMPLETE CBC W/AUTO DIFF WBC: CPT

## 2023-12-27 PROCEDURE — 36415 COLL VENOUS BLD VENIPUNCTURE: CPT

## 2023-12-27 PROCEDURE — 80053 COMPREHEN METABOLIC PANEL: CPT

## 2023-12-27 RX ORDER — LEVOTHYROXINE SODIUM 0.05 MG/1
50 TABLET ORAL DAILY
Qty: 90 TABLET | Refills: 3 | Status: SHIPPED | OUTPATIENT
Start: 2023-12-27

## 2024-03-13 DIAGNOSIS — E78.2 MIXED HYPERLIPIDEMIA: ICD-10-CM

## 2024-03-13 RX ORDER — PRAVASTATIN SODIUM 10 MG
10 TABLET ORAL NIGHTLY
Qty: 90 TABLET | Refills: 3 | Status: SHIPPED | OUTPATIENT
Start: 2024-03-13

## 2024-03-13 NOTE — TELEPHONE ENCOUNTER
Requested Prescriptions     Pending Prescriptions Disp Refills    PRAVASTATIN 10 MG Oral Tab [Pharmacy Med Name: PRAVASTATIN 10MG TABLETS] 90 tablet 3     Sig: TAKE 1 TABLET(10 MG) BY MOUTH EVERY NIGHT     LOV 10/13/2023     Patient was asked to follow-up in: 6 months    Appointment scheduled: 4/15/2024 Bi Davis MD     Medication NOT refilled per protocol.    Cholesterol Medication Protocol [id number]/13/2024 05:18 AM   Protocol Details Lipid panel within past 12 months    ALT < 80    ALT resulted within past year    In person appointment or virtual visit in the past 12 mos or appointment in next 3 mos

## 2024-04-13 NOTE — ASSESSMENT & PLAN NOTE
Thyroid shows Good control.   TSH: 0.717, done on 12/27/2023.  FT4: 1.2, done on 10/21/2022.   Thyroid therapy includes levothyroxine 50 MCG Oral Tab [[id number]], levothyroxine 50 MCG Oral Tab [[id number]] (Long-Term Med).

## 2024-04-13 NOTE — ASSESSMENT & PLAN NOTE
Cholesterol shows Good control. Long term heart-healthy diet and lifestyle discussed and encouraged to reduce risk of cardiovascular disease.  Cholesterol: 202, done on 10/21/2022.  HDL Cholesterol: 77, done on 10/21/2022.  TriGlycerides 57, done on 10/21/2022.  LDL Cholesterol: 115, done on 10/21/2022.   Cholesterol medications include pravastatin 10 MG Oral Tab [[id number]].

## 2024-04-15 ENCOUNTER — OFFICE VISIT (OUTPATIENT)
Dept: FAMILY MEDICINE CLINIC | Facility: CLINIC | Age: 79
End: 2024-04-15
Payer: MEDICARE

## 2024-04-15 ENCOUNTER — TELEPHONE (OUTPATIENT)
Dept: FAMILY MEDICINE CLINIC | Facility: CLINIC | Age: 79
End: 2024-04-15

## 2024-04-15 VITALS
SYSTOLIC BLOOD PRESSURE: 136 MMHG | HEIGHT: 60.5 IN | DIASTOLIC BLOOD PRESSURE: 80 MMHG | RESPIRATION RATE: 14 BRPM | WEIGHT: 105.81 LBS | HEART RATE: 84 BPM | BODY MASS INDEX: 20.24 KG/M2

## 2024-04-15 DIAGNOSIS — E04.1 NONTOXIC UNINODULAR GOITER: ICD-10-CM

## 2024-04-15 DIAGNOSIS — I70.0 ATHEROSCLEROSIS OF AORTA (HCC): ICD-10-CM

## 2024-04-15 DIAGNOSIS — E78.2 MIXED HYPERLIPIDEMIA: ICD-10-CM

## 2024-04-15 DIAGNOSIS — I77.9 BILATERAL CAROTID ARTERY DISEASE, UNSPECIFIED TYPE (HCC): Primary | ICD-10-CM

## 2024-04-15 DIAGNOSIS — E46 PROTEIN-CALORIE MALNUTRITION, UNSPECIFIED SEVERITY (HCC): ICD-10-CM

## 2024-04-15 DIAGNOSIS — E55.9 VITAMIN D DEFICIENCY: ICD-10-CM

## 2024-04-15 DIAGNOSIS — K21.9 GASTROESOPHAGEAL REFLUX DISEASE WITHOUT ESOPHAGITIS: ICD-10-CM

## 2024-04-15 PROCEDURE — G2211 COMPLEX E/M VISIT ADD ON: HCPCS | Performed by: FAMILY MEDICINE

## 2024-04-15 PROCEDURE — 99214 OFFICE O/P EST MOD 30 MIN: CPT | Performed by: FAMILY MEDICINE

## 2024-04-15 NOTE — TELEPHONE ENCOUNTER
Please enter lab orders for the patient's upcoming physical appointment.     Physical scheduled:   Your appointments       Date & Time Appointment Department ([place])    Oct 16, 2024 2:30 PM CDT Medicare Annual Well Visit with Bi Davis MD [facility] ([facility])              [facility]  [address]  [facility] [address]  [phone]           Preferred lab: [facility] ([facility])     The patient has been notified to complete fasting labs prior to their physical appointment.

## 2024-04-15 NOTE — PROGRESS NOTES
Nilam is a 78 year old female coming in for had concerns including Blood Pressure (Follow up ) and Gastro-esophageal Reflux (Would like to discuss ).    Subjective:   HPI   GErd, talking PPI prevacid nightly and levothyroxine daily, APAP as well for wrist pain.   Stable BP  Stress from zamzam recent dall.     Objective:   /80   Pulse 84   Resp 14   Ht 5' 0.5\" (1.537 m)   Wt 105 lb 12.8 oz (48 kg)   BMI 20.32 kg/m²  Body mass index is 20.32 kg/m².   Physical Exam  Vitals and nursing note reviewed.   Constitutional:       General: She is not in acute distress.     Appearance: Normal appearance. She is well-developed.   HENT:      Head: Normocephalic and atraumatic.   Cardiovascular:      Rate and Rhythm: Normal rate and regular rhythm.      Pulses:           Posterior tibial pulses are 2+ on the right side and 2+ on the left side.      Heart sounds: Normal heart sounds. No murmur heard.  Pulmonary:      Effort: Pulmonary effort is normal. No respiratory distress.      Breath sounds: Normal breath sounds. No wheezing.   Abdominal:      General: Bowel sounds are normal.      Palpations: Abdomen is soft.      Tenderness: There is no abdominal tenderness.   Musculoskeletal:         General: Normal range of motion.      Cervical back: Normal range of motion.      Right lower leg: No edema.      Left lower leg: No edema.   Skin:     General: Skin is warm and dry.      Findings: No rash.   Neurological:      Mental Status: She is alert and oriented to person, place, and time.   Psychiatric:         Mood and Affect: Mood normal.         Behavior: Behavior normal.         Thought Content: Thought content normal.         Judgment: Judgment normal.           Assessment & Plan:   1. Bilateral carotid artery disease, unspecified type (HCC) (Primary)  Overview:  6/2014 carotid duplex showed less than 50% bilaterall  Assessment & Plan:  Stable, continue present management with regular cholesterol cheking.  2.  Atherosclerosis of aorta (HCC)  Overview:  Mild atherosclerosis of aorta seen on CT 7/2017   Assessment & Plan:  Stable levels. Continue to monitor progress  3. Protein-calorie malnutrition, unspecified severity (HCC)  Overview:  BMI 19  Assessment & Plan:  Stable low weight. Stable labs. Continue present management   4. Mixed hyperlipidemia  Overview:  Therapeutic lifestyle until atorvastatin 20 started 8/16/2019   Assessment & Plan:  Cholesterol shows Good control. Long term heart-healthy diet and lifestyle discussed and encouraged to reduce risk of cardiovascular disease.  Cholesterol: 202, done on 10/21/2022.  HDL Cholesterol: 77, done on 10/21/2022.  TriGlycerides 57, done on 10/21/2022.  LDL Cholesterol: 115, done on 10/21/2022.   Cholesterol medications include pravastatin 10 MG Oral Tab [[id number]].     5. Nontoxic uninodular goiter  Overview:  levothhyroxine 50, seeing Dr Aguilar  Assessment & Plan:  Thyroid shows Good control.   TSH: 0.717, done on 12/27/2023.  FT4: 1.2, done on 10/21/2022.   Thyroid therapy includes levothyroxine 50 MCG Oral Tab [[id number]], levothyroxine 50 MCG Oral Tab [[id number]] (Long-Term Med).   6. Vitamin D deficiency  Overview:  OTC supplmentation  Assessment & Plan:  9/27/2021: Vitamin D, 25OH, Total 52.5   7. Gastroesophageal reflux disease without esophagitis  Overview:  Pepcid 20  Assessment & Plan:  Stable, continue present management     Orders:  -     GASTRO - INTERNAL     I am having Nilam Lantigua maintain her olopatadine, Co Q 10, Magnesium, Calcium-D, famotidine, azelastine, estradiol, Lutein-Zeaxanthin, levothyroxine, and pravastatin.       Return in about 6 months (around 10/15/2024) for AWV with chonic condition follow up.

## 2024-06-06 ENCOUNTER — TELEPHONE (OUTPATIENT)
Dept: UROLOGY | Facility: CLINIC | Age: 79
End: 2024-06-06

## 2024-06-11 ENCOUNTER — OFFICE VISIT (OUTPATIENT)
Dept: UROLOGY | Facility: CLINIC | Age: 79
End: 2024-06-11
Attending: OBSTETRICS & GYNECOLOGY
Payer: MEDICARE

## 2024-06-11 VITALS
SYSTOLIC BLOOD PRESSURE: 128 MMHG | HEIGHT: 60.5 IN | WEIGHT: 102 LBS | BODY MASS INDEX: 19.51 KG/M2 | DIASTOLIC BLOOD PRESSURE: 70 MMHG

## 2024-06-11 DIAGNOSIS — N81.2 UTEROVAGINAL PROLAPSE, INCOMPLETE: Primary | ICD-10-CM

## 2024-06-11 DIAGNOSIS — N95.2 POSTMENOPAUSAL ATROPHIC VAGINITIS: ICD-10-CM

## 2024-06-11 PROCEDURE — 99212 OFFICE O/P EST SF 10 MIN: CPT

## 2024-06-11 RX ORDER — ESTRADIOL 0.1 MG/G
CREAM VAGINAL
Qty: 42.5 G | Refills: 3 | Status: SHIPPED | OUTPATIENT
Start: 2024-06-11

## 2024-06-11 NOTE — PROGRESS NOTES
Patient presents to follow up bulge    She is currently using vag estrogen (ran out)    She reports +improvement   No UTis  Bowels reg  Not sexually active  Happy  Sx stable, no worse    /70   Ht 60.5\"   Wt 102 lb (46.3 kg)   BMI 19.59 kg/m²     GEN: NAD  CV: RRR  Pulm: nl effort  Abd: soft    PELVIC EXAM:  Ext. Gen: +atrophy, no lesions  Urethra: +atrophy, nontender  Bladder:+fullness, nontender  Vagina: +atrophy  Cervix: no bleeding, no lesions, nontender  Uterus: +mobile  Adnexa:no masses, nontender  Perineum: nontender  Anus: wnl  Rectum: defer     PELVIS FLOOR NEUROMUSCULAR FUNCTION:  Strength:  2 and Unable to hold greater than 3 sec  Perineal Sensation:  Normal        PELVIC SUPPORT:  [place]:  2  Ant:  3  Post:  2  CST:  negative  UVJ: +hypermobile    Impression/Plan:    ICD-10-CM    1. Uterovaginal prolapse, incomplete  N81.2       2. Postmenopausal atrophic vaginitis  N95.2 estradiol (ESTRACE) 0.1 MG/GM Vaginal Cream          Discussion Items:   Discussed mgmt of vulvovaginal atrophy with vaginal estrogen cream. Reviewed associated benefits, risks, alternatives, and goals. Recommend low dose twice weekly mgmt   Resume vag estrogen    Discussed management of pelvic organ prolapse including but not limited to behavioral modifications, conservative options, and surgical management.   Discussed pessary management including benefits and risks. Discussed importance of keeping regularly scheduled pessary checks in prevention of complications related to pessary use.     Call with s/sx of UTI    All questions answered  She understands and agrees to plan    Return in about 1 year (around 6/11/2025) for sooner prn.    Elida Olson, DO, FACOG, FACS    The 21st Century Cures Act makes medical notes like these available to patients in the interest of transparency. However, be advised this is a medical document. It is intended as peer to peer communication. It is written in medical language and may contain  abbreviations or verbiage that are unfamiliar. It may appear blunt or direct. Medical documents are intended to carry relevant information, facts as evident, and the clinical opinion of the practitioner.

## 2024-06-20 ENCOUNTER — TELEPHONE (OUTPATIENT)
Dept: ORTHOPEDICS CLINIC | Facility: CLINIC | Age: 79
End: 2024-06-20

## 2024-06-20 DIAGNOSIS — M25.532 LEFT WRIST PAIN: ICD-10-CM

## 2024-06-20 DIAGNOSIS — M25.531 RIGHT WRIST PAIN: Primary | ICD-10-CM

## 2024-06-20 NOTE — TELEPHONE ENCOUNTER
Future Appointments   Date Time Provider Department Center   6/27/2024  8:00 AM Kym Dos Santos DO SGINP E[facility] SUB GI   7/3/2024  2:45 PM OS US RM1 OS US [place]   7/3/2024  3:30 PM OS DEXA RM1 OS DEXA [place]   7/22/2024 11:40 AM [id number]   10/16/2024  2:30 PM Bi Davis MD EMG 3 EMG [place]   7/15/2025  1:30 PM Elida Olson DO [facility] None     Please advise if patient needs ronnie wrist pain.

## 2024-07-03 ENCOUNTER — HOSPITAL ENCOUNTER (OUTPATIENT)
Dept: BONE DENSITY | Age: 79
Discharge: HOME | End: 2024-07-03
Attending: FAMILY MEDICINE
Payer: MEDICARE

## 2024-07-03 ENCOUNTER — HOSPITAL ENCOUNTER (OUTPATIENT)
Dept: ULTRASOUND IMAGING | Age: 79
Discharge: HOME | End: 2024-07-03
Attending: PHYSICIAN ASSISTANT
Payer: MEDICARE

## 2024-07-03 DIAGNOSIS — E04.2 MULTIPLE THYROID NODULES: ICD-10-CM

## 2024-07-03 DIAGNOSIS — Z78.0 ASYMPTOMATIC MENOPAUSAL STATE: ICD-10-CM

## 2024-07-03 PROCEDURE — 76536 US EXAM OF HEAD AND NECK: CPT | Performed by: PHYSICIAN ASSISTANT

## 2024-07-03 PROCEDURE — 77080 DXA BONE DENSITY AXIAL: CPT | Performed by: FAMILY MEDICINE

## 2024-07-22 ENCOUNTER — HOSPITAL ENCOUNTER (OUTPATIENT)
Dept: GENERAL RADIOLOGY | Age: 79
Discharge: HOME | End: 2024-07-22
Attending: PHYSICIAN ASSISTANT
Payer: MEDICARE

## 2024-07-22 ENCOUNTER — OFFICE VISIT (OUTPATIENT)
Dept: ORTHOPEDICS CLINIC | Facility: CLINIC | Age: 79
End: 2024-07-22
Payer: MEDICARE

## 2024-07-22 VITALS — HEIGHT: 61 IN | WEIGHT: 106 LBS | BODY MASS INDEX: 20.01 KG/M2

## 2024-07-22 DIAGNOSIS — M25.532 LEFT WRIST PAIN: ICD-10-CM

## 2024-07-22 DIAGNOSIS — M19.039 WRIST ARTHRITIS: Primary | ICD-10-CM

## 2024-07-22 DIAGNOSIS — M25.531 RIGHT WRIST PAIN: ICD-10-CM

## 2024-07-22 PROCEDURE — 73110 X-RAY EXAM OF WRIST: CPT | Performed by: PHYSICIAN ASSISTANT

## 2024-07-22 PROCEDURE — 99203 OFFICE O/P NEW LOW 30 MIN: CPT | Performed by: PHYSICIAN ASSISTANT

## 2024-07-22 NOTE — H&P
Clinic Note [facility]     Assessment/Plan:  78 year old female    Bilateral wrist arthritis-we discussed conservative management with bracing and anti-inflammatories including topical Voltaren.  We discussed cortisone injection should her symptoms worsen.  She will contact me if she is interested in that.  I have given her a wrist brace today to help with some of her symptoms.    No diagnosis found.     Follow Up: As needed    Diagnostic Studies:  X-rays reveal bilateral wrist arthritis with bone spurs and SL widening severe STT arthritis.    Physical Exam:    Ht 5' 1\" (1.549 m)   Wt 106 lb (48.1 kg)   BMI 20.03 kg/m²     Constitutional: NAD. AOx3. Well-developed and Well-nourished.   Psychiatric: Normal mood/ affect/ behavior. Judgment and thought content normal.     Bilateral upper Extremity:   Inspection: Skin Intact. No skin lesions. No gross deformity.  Dorsal radial swelling   Palpation: Tender palpation along radial carpal joint   Motion: Elbow: normal bilateral symmetric ext/flex  Wrist: 40/40 bilaterally  Finger: full composite fist       CC: Wrist Pain (BRITTANY WRIST PAIN; ONSET: 20 YEARS AGO; NO INJURY )        HPI: This 78 year old female presents with complaints of bilateral wrist pain she states been ongoing 20 years and getting worse.  She denies any specific injury other than possible scaphoid fracture about 8 years ago.  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  She states she still very active.  She rates the pain mild to moderate and gets worse with certain activities it does sometimes bother her at night      @[email or web address]@      Past Medical History:    Arthritis    Atypical mole    Had them checked once - No problem    Body piercing    Chest pain    Chronic cough    It's why I take Ranitidine and Diphenhydramine    Easy bruising    Esophageal reflux    Eye disease    Cataracts forming - no impeded vision yet    Heartburn    Hemorrhoids    High cholesterol    Hyperlipidemia     Leaking of urine    Pain in joints    Dental Hygienist and broke bone.    Stress    Figuring out computer - ha!    Thyroid disease    Uncomfortable fullness after meals    Wears glasses    Weight loss    Slight - since I put my  on a diet.       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 90 tablet 3    levothyroxine 50 MCG Oral Tab Take 1 tablet (50 mcg total) by mouth daily. 90 tablet 3    Lutein-Zeaxanthin 25-5 MG Oral Cap       famoTIDine 20 MG Oral Tab Take 1 tablet (20 mg total) by mouth nightly as needed for Heartburn.      Coenzyme Q10 (CO Q 10) 100 MG Oral Cap Take by mouth.      Magnesium 200 MG Oral Tab Take by mouth.      Calcium Carbonate-Vitamin D (CALCIUM-D) 600-400 MG-UNIT Oral Tab Take by mouth.      Olopatadine HCl 0.1 % Ophthalmic Solution Place 1 drop into both eyes 2 (two) times daily. 15 mL 3    Azelastine HCl 0.1 % Nasal Solution 1 spray by Nasal route 2 (two) times daily. 3 each 3       Allergies   Allergen Reactions    Codeine ANAPHYLAXIS     derivatives    Flonase [Fluticasone] OTHER (SEE COMMENTS)     Trouble Sleeping       Family History   Problem Relation Age of Onset    Cancer Father         Pancreatic age 99    Colon Cancer Father     Colon Polyps Father     Other (pancreatic ca) Father     Other (ischemic stroke) Mother     Stroke Mother        Social History     Occupational History    Occupation: retired   Tobacco Use    Smoking status: Never    Smokeless tobacco: Never     Tobacco comments:     Never   Substance and Sexual Activity    Alcohol use: Yes     Alcohol/week: 1.0 - 2.0 standard drink of alcohol     Types: 1 - 2 Standard drinks or equivalent per week     Comment: 1 - 2  servings a week    Drug use: Never    Sexual activity: Not on file        Review of Systems (negative unless bolded):  General: fevers, chills, fatigue  CV:  chest pain, palpitations, leg swelling  Msk: bodyaches, neck pain, neck stiffness  Skin: rashes, open wounds, nonhealing ulcers  Hem: bleeds easily, bruise easily, immunocompromised  Neuro: dizziness, light headedness, headaches  Psych: anxious, depressed, anger issues  Aida Kumari PA-C  Hand, Wrist, & Elbow Surgery  Physician Assistant to Dr. Guillermo [email or web address]  t: [phone]  f: [phone]

## 2024-08-23 PROBLEM — Z86.0101 HISTORY OF ADENOMATOUS POLYP OF COLON: Status: ACTIVE | Noted: 2024-08-23

## 2024-08-23 PROBLEM — Z86.010 HISTORY OF ADENOMATOUS POLYP OF COLON: Status: ACTIVE | Noted: 2024-08-23

## 2024-08-23 PROBLEM — D12.2 BENIGN NEOPLASM OF ASCENDING COLON: Status: ACTIVE | Noted: 2024-08-23

## 2024-08-23 PROBLEM — D12.0 BENIGN NEOPLASM OF CECUM: Status: ACTIVE | Noted: 2024-08-23

## 2024-08-30 ENCOUNTER — APPOINTMENT (OUTPATIENT)
Dept: CT IMAGING | Age: 79
End: 2024-08-30
Attending: NURSE PRACTITIONER
Payer: MEDICARE

## 2024-08-30 ENCOUNTER — TELEPHONE (OUTPATIENT)
Dept: FAMILY MEDICINE CLINIC | Facility: CLINIC | Age: 79
End: 2024-08-30

## 2024-08-30 ENCOUNTER — HOSPITAL ENCOUNTER (OUTPATIENT)
Age: 79
Discharge: HOME | End: 2024-08-30
Payer: MEDICARE

## 2024-08-30 VITALS
SYSTOLIC BLOOD PRESSURE: 135 MMHG | RESPIRATION RATE: 14 BRPM | HEART RATE: 76 BPM | OXYGEN SATURATION: 99 % | DIASTOLIC BLOOD PRESSURE: 90 MMHG

## 2024-08-30 DIAGNOSIS — R42 DIZZY: Primary | ICD-10-CM

## 2024-08-30 LAB
#MXD IC: 0.4 X10ˆ3/UL (ref 0.1–1)
BUN BLD-MCNC: 17 MG/DL (ref 7–18)
CHLORIDE BLD-SCNC: 104 MMOL/L (ref 98–112)
CO2 BLD-SCNC: 26 MMOL/L (ref 21–32)
CREAT BLD-MCNC: 0.9 MG/DL
EGFRCR SERPLBLD CKD-EPI 2021: 65 ML/MIN/1.73M2 (ref 60–?)
GLUCOSE BLD-MCNC: 88 MG/DL (ref 70–99)
HCT VFR BLD AUTO: 38 %
HCT VFR BLD CALC: 37 %
HGB BLD-MCNC: 12.3 G/DL
ISTAT IONIZED CALCIUM FOR CHEM 8: 1.22 MMOL/L (ref 1.12–1.32)
LYMPHOCYTES # BLD AUTO: 1.8 X10ˆ3/UL (ref 1–4)
LYMPHOCYTES NFR BLD AUTO: 31.3 %
MCH RBC QN AUTO: 30.8 PG (ref 26–34)
MCHC RBC AUTO-ENTMCNC: 32.4 G/DL (ref 31–37)
MCV RBC AUTO: 95 FL (ref 80–100)
MIXED CELL %: 7.6 %
NEUTROPHILS # BLD AUTO: 3.5 X10ˆ3/UL (ref 1.5–7.7)
NEUTROPHILS NFR BLD AUTO: 61.1 %
PLATELET # BLD AUTO: 264 X10ˆ3/UL (ref 150–450)
POTASSIUM BLD-SCNC: 3.9 MMOL/L (ref 3.6–5.1)
RBC # BLD AUTO: 4 X10ˆ6/UL
SODIUM BLD-SCNC: 141 MMOL/L (ref 136–145)
TROPONIN I BLD-MCNC: <0.02 NG/ML
WBC # BLD AUTO: 5.7 X10ˆ3/UL (ref 4–11)

## 2024-08-30 PROCEDURE — 99214 OFFICE O/P EST MOD 30 MIN: CPT | Performed by: NURSE PRACTITIONER

## 2024-08-30 PROCEDURE — 80047 BASIC METABLC PNL IONIZED CA: CPT | Performed by: NURSE PRACTITIONER

## 2024-08-30 PROCEDURE — 93000 ELECTROCARDIOGRAM COMPLETE: CPT | Performed by: NURSE PRACTITIONER

## 2024-08-30 PROCEDURE — 70450 CT HEAD/BRAIN W/O DYE: CPT | Performed by: NURSE PRACTITIONER

## 2024-08-30 PROCEDURE — 84484 ASSAY OF TROPONIN QUANT: CPT | Performed by: NURSE PRACTITIONER

## 2024-08-30 PROCEDURE — 85025 COMPLETE CBC W/AUTO DIFF WBC: CPT | Performed by: NURSE PRACTITIONER

## 2024-08-30 PROCEDURE — 96360 HYDRATION IV INFUSION INIT: CPT | Performed by: NURSE PRACTITIONER

## 2024-08-30 RX ORDER — SODIUM CHLORIDE 9 MG/ML
1000 INJECTION, SOLUTION INTRAVENOUS ONCE
Status: COMPLETED | OUTPATIENT
Start: 2024-08-30 | End: 2024-08-30

## 2024-08-30 RX ORDER — MECLIZINE HYDROCHLORIDE 25 MG/1
25 TABLET ORAL 3 TIMES DAILY PRN
Qty: 21 TABLET | Refills: 0 | Status: SHIPPED | OUTPATIENT
Start: 2024-08-30 | End: 2024-09-06

## 2024-08-30 NOTE — TELEPHONE ENCOUNTER
Talked to patient she feels light headed has been drinking lots of water abl to do right ADL's but feels off I told her she shuld go to the urgent care in [place] to be seen today. She will do this.

## 2024-08-30 NOTE — TELEPHONE ENCOUNTER
Pt is calling she is feeling very dizzy and nauseous after her colonoscopy.  She feels like she is in a daze, like just waking up.  107 is her BP

## 2024-08-30 NOTE — ED INITIAL ASSESSMENT (HPI)
Patient seen today with c/o dizziness and dull HA since her colonoscopy last week. Symptoms not getting worse but still there. Patient states had low BP at home. Nausea when moving her head.

## 2024-08-30 NOTE — DISCHARGE INSTRUCTIONS
Follow-up with your primary care provider for all of your healthcare needs  Increase fluids keep hydrated  Take meclizine every 8 hours as needed  Return to the emergency room for worse symptoms or concerns

## 2024-08-30 NOTE — ED PROVIDER NOTES
Patient Seen in: [facility]      History   No chief complaint on file.    Stated Complaint: dizzy    Subjective:   HPI  78-year-old female presents to the immediate care with complaints of dizziness and just feeling off.  Patient that she had a colonoscopy last week and ever since then she has been feeling slightly off.  Did go to her [employer] class twice this week already with no complications.  Denies any numbness or tingling denies any generalized weakness.  Denies any slurred speech no drooling.  States she does have a mild dull headache and maybe think she is dehydrated.  Denies any chest pain palpitations shortness of breath or any difficulty breathing.  Patient has no other issues or concerns at this time.    Objective:   No pertinent past medical history.            No pertinent past surgical history.              No pertinent social history.            Review of Systems    Positive for stated Chief Complaint: No chief complaint on file.    Other systems are as noted in HPI.  Constitutional and vital signs reviewed.      All other systems reviewed and negative except as noted above.    Physical Exam     ED Triage Vitals [08/30/24 1649]   /90   Pulse 76   Resp 14   Temp    Temp src    SpO2 99 %   O2 Device None (Room air)       Current Vitals:   Vital Signs  BP: 135/90  Pulse: 76  Resp: 14    Oxygen Therapy  SpO2: 99 %  O2 Device: None (Room air)            Physical Exam    GENERAL: The patient is well-developed well-nourished nontoxic non-ill-appearing.  HEENT: Normocephalic.  Atraumatic.  Extraocular motions are intact.  Patient has moist mucous membranes.  NECK: Supple.  No meningitic signs are noted.  There is no adenopathy noted.  CHEST/LUNGS: Clear to auscultation.  There is no respiratory distress noted.  HEART/CARDIOVASCULAR: Regular.  There is no tachycardia.  There is no gallop rub or murmur.  SKIN: There is no rash.  There is no edema.  There is no diaphoresis.  NEURO: The patient  is awake, alert, and oriented.  The patient is cooperative.  The patient has no focal neurologic deficits.  The patient has normal speech and gait.  MUSCULOSKELETAL: There is no tenderness or deformity.  There is no limitation range of motion.  There is no evidence of acute injury.    ED Course     Labs Reviewed   POCT ISTAT CHEM8 CARTRIDGE - Normal   ISTAT TROPONIN - Normal   POCT CBC   IV will be established will check CBC i-STAT troponin, will get CT of the brain without contrast  EKG    Rate, intervals and axes as noted on EKG Report.  Rate: 75  Rhythm: Sinus Rhythm  Reading: No ST elevation depression or acute ischemia is noted otherwise normal EKG         CT BRAIN OR HEAD (CPT=70450)    Result Date: 8/30/2024  PROCEDURE:  CT BRAIN OR HEAD (36123)  COMPARISON:  None.  INDICATIONS:  dizzy, headache  TECHNIQUE:  Noncontrast CT scanning is performed through the brain. Dose reduction techniques were used. Dose information is transmitted to the ACR (American College of Radiology) NRDR (National Radiology Data Registry) which includes the Dose Index Registry.  PATIENT STATED HISTORY: (As transcribed by Technologist)  The patient has dizziness with a dull headache.    FINDINGS:  VENTRICLES/SULCI:  Ventricles and sulci are normal in size.  INTRACRANIAL:  There are no abnormal extraaxial fluid collections.  There is no midline shift.  There are no intraparenchymal brain abnormalities.  There is nothing specific for acute infarct.  There is no hemorrhage or mass lesion.  SINUSES:           No sign of acute sinusitis.  MASTOIDS:          No sign of acute inflammation. SKULL:             No evidence for fracture or osseous abnormality. OTHER:             None.            CONCLUSION:  No acute process    LOCATION:  [id number]   Dictated by (CST): Lambert Montano MD on 8/30/2024 at 5:30 PM     Finalized by (CST): Lambert Montano MD on 8/30/2024 at 5:30 PM               [facility] [place]   Patient with dizziness/vertigo and consider possible  life-threatening etiologies are presenting complaint including central CNS etiology, cardiac etiology, hypokalemia, hyper/hypoglycemia, anemia, etc. and doubt given; history, exam, labs as noted and unremarkable blood sugar which is within normal limits, EKG with no acute changes, orthostatics which were normal, CT scan of the head which is negative for any acute intracranial process.  Symptoms which have markedly improved with IC management and patient amatory without difficulty and neurologically intact with no other focal neurological findings or complaints.  Patient afebrile with vital signs and nontoxic appearing in no acute distress no respiratory stress test will DC to follow-up in 1 to 2 days.  Advised to return to the ED if any new or worsening symptoms include but not limited to; persistent or worsening dizziness/vertigo, numbness/tingling, weakness, nausea/vomiting, chest pain/shortness of breath, change in mental status, fever or any other concerns.  Prescriptions given.                                 Medical Decision Making      Disposition and Plan     Clinical Impression:  1. Dizzy         Disposition:  Discharge  8/30/2024  5:41 pm    Follow-up:  Bi Davis MD  [address]  [facility] [address] [phone]                Medications Prescribed:  Current Discharge Medication List        START taking these medications    Details   meclizine 25 MG Oral Tab Take 1 tablet (25 mg total) by mouth 3 (three) times daily as needed.  Qty: 21 tablet, Refills: 0

## 2024-08-31 LAB
ATRIAL RATE: 75 BPM
P AXIS: 40 DEGREES
P-R INTERVAL: 180 MS
Q-T INTERVAL: 386 MS
QRS DURATION: 86 MS
QTC CALCULATION (BEZET): 431 MS
R AXIS: 66 DEGREES
T AXIS: 52 DEGREES
VENTRICULAR RATE: 75 BPM

## 2024-09-25 ENCOUNTER — LAB ENCOUNTER (OUTPATIENT)
Dept: LAB | Age: 79
End: 2024-09-25
Attending: FAMILY MEDICINE
Payer: MEDICARE

## 2024-09-25 DIAGNOSIS — Z79.899 LONG-TERM USE OF HIGH-RISK MEDICATION: ICD-10-CM

## 2024-09-25 DIAGNOSIS — Z13.0 SCREENING FOR IRON DEFICIENCY ANEMIA: ICD-10-CM

## 2024-09-25 DIAGNOSIS — Z13.6 SCREENING FOR CARDIOVASCULAR CONDITION: ICD-10-CM

## 2024-09-25 DIAGNOSIS — Z13.29 SCREENING FOR THYROID DISORDER: ICD-10-CM

## 2024-09-25 DIAGNOSIS — E78.5 DYSLIPIDEMIA: ICD-10-CM

## 2024-09-25 DIAGNOSIS — Z13.1 SCREENING FOR DIABETES MELLITUS: ICD-10-CM

## 2024-09-25 DIAGNOSIS — E55.9 VITAMIN D DEFICIENCY: ICD-10-CM

## 2024-09-25 LAB
ALBUMIN SERPL-MCNC: 4.8 G/DL (ref 3.2–4.8)
ALBUMIN/GLOB SERPL: 1.7 {RATIO} (ref 1–2)
ALP LIVER SERPL-CCNC: 82 U/L
ALT SERPL-CCNC: 20 U/L
ANION GAP SERPL CALC-SCNC: 6 MMOL/L (ref 0–18)
AST SERPL-CCNC: 24 U/L (ref ?–34)
BASOPHILS # BLD AUTO: 0.03 X10(3) UL (ref 0–0.2)
BASOPHILS NFR BLD AUTO: 0.6 %
BILIRUB SERPL-MCNC: 0.9 MG/DL (ref 0.2–1.1)
BUN BLD-MCNC: 17 MG/DL (ref 9–23)
CALCIUM BLD-MCNC: 10.4 MG/DL (ref 8.7–10.4)
CHLORIDE SERPL-SCNC: 105 MMOL/L (ref 98–112)
CHOLEST SERPL-MCNC: 214 MG/DL (ref ?–200)
CO2 SERPL-SCNC: 30 MMOL/L (ref 21–32)
CREAT BLD-MCNC: 0.76 MG/DL
EGFRCR SERPLBLD CKD-EPI 2021: 79 ML/MIN/1.73M2 (ref 60–?)
EOSINOPHIL # BLD AUTO: 0.17 X10(3) UL (ref 0–0.7)
EOSINOPHIL NFR BLD AUTO: 3.5 %
ERYTHROCYTE [DISTWIDTH] IN BLOOD BY AUTOMATED COUNT: 12.1 %
FASTING PATIENT LIPID ANSWER: YES
FASTING STATUS PATIENT QL REPORTED: YES
GLOBULIN PLAS-MCNC: 2.9 G/DL (ref 2–3.5)
GLUCOSE BLD-MCNC: 98 MG/DL (ref 70–99)
HCT VFR BLD AUTO: 40.9 %
HDLC SERPL-MCNC: 74 MG/DL (ref 40–59)
HGB BLD-MCNC: 13.5 G/DL
IMM GRANULOCYTES # BLD AUTO: 0.01 X10(3) UL (ref 0–1)
IMM GRANULOCYTES NFR BLD: 0.2 %
LDLC SERPL CALC-MCNC: 130 MG/DL (ref ?–100)
LYMPHOCYTES # BLD AUTO: 1.75 X10(3) UL (ref 1–4)
LYMPHOCYTES NFR BLD AUTO: 36.5 %
MCH RBC QN AUTO: 31.3 PG (ref 26–34)
MCHC RBC AUTO-ENTMCNC: 33 G/DL (ref 31–37)
MCV RBC AUTO: 94.7 FL
MONOCYTES # BLD AUTO: 0.48 X10(3) UL (ref 0.1–1)
MONOCYTES NFR BLD AUTO: 10 %
NEUTROPHILS # BLD AUTO: 2.36 X10 (3) UL (ref 1.5–7.7)
NEUTROPHILS # BLD AUTO: 2.36 X10(3) UL (ref 1.5–7.7)
NEUTROPHILS NFR BLD AUTO: 49.2 %
NONHDLC SERPL-MCNC: 140 MG/DL (ref ?–130)
OSMOLALITY SERPL CALC.SUM OF ELEC: 294 MOSM/KG (ref 275–295)
PLATELET # BLD AUTO: 248 10(3)UL (ref 150–450)
POTASSIUM SERPL-SCNC: 4.7 MMOL/L (ref 3.5–5.1)
PROT SERPL-MCNC: 7.7 G/DL (ref 5.7–8.2)
RBC # BLD AUTO: 4.32 X10(6)UL
SODIUM SERPL-SCNC: 141 MMOL/L (ref 136–145)
TRIGL SERPL-MCNC: 57 MG/DL (ref 30–149)
TSI SER-ACNC: 0.74 MIU/ML (ref 0.55–4.78)
VIT D+METAB SERPL-MCNC: 67 NG/ML (ref 30–100)
VLDLC SERPL CALC-MCNC: 10 MG/DL (ref 0–30)
WBC # BLD AUTO: 4.8 X10(3) UL (ref 4–11)

## 2024-09-25 PROCEDURE — 82306 VITAMIN D 25 HYDROXY: CPT

## 2024-09-25 PROCEDURE — 80061 LIPID PANEL: CPT

## 2024-09-25 PROCEDURE — 36415 COLL VENOUS BLD VENIPUNCTURE: CPT

## 2024-09-25 PROCEDURE — 84443 ASSAY THYROID STIM HORMONE: CPT

## 2024-09-25 PROCEDURE — 85025 COMPLETE CBC W/AUTO DIFF WBC: CPT

## 2024-09-25 PROCEDURE — 80053 COMPREHEN METABOLIC PANEL: CPT

## 2024-10-02 NOTE — TELEPHONE ENCOUNTER
As we discussed in the office visit after reviewing most recent blood test your kidney function remains stable with a more recent creatinine of 1.9.  Follow low-sodium diet less than 2 g sodium a day.  Try to keep your legs elevated for every couple of hours for 10 to 15 minutes.  Recommend to avoid milk of magnesia regularly given advanced CKD.  No changes in your other medications.  Avoid NSAIDs (no ibuprofen, Motrin, Advil, Aleve, naproxen).  Okay to take Tylenol or acetaminophen as needed for pain.  Continue close follow-up with your primary care doctor.  I would like to see you back in the office in 6 months with repeat labs.     Pended order for new dosage will ask Dr Branden Jon if this is the dose he wants to keep

## 2024-10-29 ENCOUNTER — TELEPHONE (OUTPATIENT)
Dept: FAMILY MEDICINE CLINIC | Facility: CLINIC | Age: 79
End: 2024-10-29

## 2024-10-29 DIAGNOSIS — D48.62 NEOPLASM OF UNCERTAIN BEHAVIOR OF LEFT BREAST: ICD-10-CM

## 2024-10-29 DIAGNOSIS — Z98.82 HISTORY OF BILATERAL BREAST IMPLANTS: ICD-10-CM

## 2024-10-29 DIAGNOSIS — R07.89 RIGHT-SIDED CHEST WALL PAIN: Primary | ICD-10-CM

## 2024-10-29 NOTE — TELEPHONE ENCOUNTER
1. Right-sided chest wall pain (Primary)  -     MRI BREAST (IMPLANTS), BILATERAL (CPT=77047); Future; Expected date: 10/29/2024  2. 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 Thanks, David Davis MD

## 2024-10-29 NOTE — TELEPHONE ENCOUNTER
----- Message from Mary PACK sent at 10/24/2024  3:39 PM CDT -----  Regarding: FW: NEW CONSULT  Jakub Celis, I am Dr. Morris' nurse . I am responding to a referral that was sent to our office by you. Would you be able to order some imaging for patient to assess for the lump? Prior to an appointment so we know how to proceed with her referral? Mammogram/U/s     Thank you,   Mary SANTIAGO  ----- Message -----  From: Jeny Brewer  Sent: 10/24/2024   3:24 PM CDT  To: Edw Elisabeth Morris Rns; Edw Bcn Steve Rns; #  Subject: FW: NEW CONSULT                                      As we were hanging up she added there is a new tender lump in her RT breast on top of the implant and this is why Dr. Davis asked her to see .      Thoughts?  ----- Message -----  From: Jney Brewer  Sent: 10/24/2024   3:21 PM CDT  To: Edw Elisabeth Morris Rns; Edw Elisabeth Wilson Rns; #  Subject: RE: NEW CONSULT                                    Hi I called the patient -       Patient has had cosmetic implants for 50 years and was told they are calcified. No imaging was done since she was told she can't do mammos and they never offered her an US.     States her breasts feel rock hard. The ONE mammo she had when the implants were 20 yrs old, the mammo showed white imaging which means calcification.     Is this appropriate for Dr. Wilson?      They are symmetrical and no apparent rupture.  ----- Message -----  From: Mary Griffin, JACK  Sent: 10/24/2024   3:08 PM CDT  To: Jeny Brewer; [facility]   Subject: FW: NEW CONSULT                                  Did we get any new information on this referral?  ----- Message -----  From: Mary Griffin, JACK  Sent: 10/22/2024  10:16 AM CDT  To: Tracee Elkins  Subject: RE: NEW CONSULT                                  Althea,   I do not see anything in care everywhere or epic for patient in relation to her breast and why she would need a referral to Dr. Morris. I do not think this is an appropriate  referral . Please gather more information.     Thank you,   Mary  ----- Message -----  From: Tracee Elkins  Sent: 10/21/2024   4:32 PM CDT  To: Edw Elisabeth Morris Rns; #  Subject: NEW CONSULT                                        NEW CONSULT    EITHER LOCATION   RECORDS IN [facility]  MEDICARE/BCBS PPO  Bi Davis MD  R BREAST     PLEASE ADVISE?

## 2024-10-30 ENCOUNTER — TELEPHONE (OUTPATIENT)
Dept: FAMILY MEDICINE CLINIC | Facility: CLINIC | Age: 79
End: 2024-10-30

## 2024-10-30 DIAGNOSIS — Z98.82 HISTORY OF BILATERAL BREAST IMPLANTS: ICD-10-CM

## 2024-10-30 DIAGNOSIS — D48.62 NEOPLASM OF UNCERTAIN BEHAVIOR OF LEFT BREAST: ICD-10-CM

## 2024-10-30 DIAGNOSIS — R07.89 RIGHT-SIDED CHEST WALL PAIN: ICD-10-CM

## 2024-10-30 NOTE — TELEPHONE ENCOUNTER
1. Right-sided chest wall pain  -     MRI BREAST (W+WO) W/CAD BILAT (CPT=77049); Future; Expected date: 10/30/2024  2. 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 Thanks, David Davis MD

## 2024-11-10 NOTE — TELEPHONE ENCOUNTER
Called OM to call back to tell her Dr Mattie Ayala sent in new medication for cholestrol <-- Click to add NO significant Past Surgical History

## 2024-11-22 ENCOUNTER — OFFICE VISIT (OUTPATIENT)
Dept: FAMILY MEDICINE CLINIC | Facility: CLINIC | Age: 79
End: 2024-11-22
Payer: MEDICARE

## 2024-11-22 VITALS
HEART RATE: 80 BPM | HEIGHT: 61 IN | RESPIRATION RATE: 14 BRPM | WEIGHT: 102.81 LBS | DIASTOLIC BLOOD PRESSURE: 86 MMHG | BODY MASS INDEX: 19.41 KG/M2 | SYSTOLIC BLOOD PRESSURE: 134 MMHG

## 2024-11-22 DIAGNOSIS — I77.9 BILATERAL CAROTID ARTERY DISEASE, UNSPECIFIED TYPE (HCC): ICD-10-CM

## 2024-11-22 DIAGNOSIS — E46 PROTEIN-CALORIE MALNUTRITION, UNSPECIFIED SEVERITY (HCC): ICD-10-CM

## 2024-11-22 DIAGNOSIS — M85.89 OSTEOPENIA OF MULTIPLE SITES: ICD-10-CM

## 2024-11-22 DIAGNOSIS — Z00.00 ANNUAL PHYSICAL EXAM: Primary | ICD-10-CM

## 2024-11-22 DIAGNOSIS — E78.2 MIXED HYPERLIPIDEMIA: ICD-10-CM

## 2024-11-22 DIAGNOSIS — E04.1 NONTOXIC UNINODULAR GOITER: ICD-10-CM

## 2024-11-22 DIAGNOSIS — Z00.00 ENCOUNTER FOR ANNUAL HEALTH EXAMINATION: ICD-10-CM

## 2024-11-22 DIAGNOSIS — I70.0 ATHEROSCLEROSIS OF AORTA (HCC): ICD-10-CM

## 2024-11-22 DIAGNOSIS — Z63.6 CAREGIVER STRESS: ICD-10-CM

## 2024-11-22 PROCEDURE — 99214 OFFICE O/P EST MOD 30 MIN: CPT | Performed by: FAMILY MEDICINE

## 2024-11-22 PROCEDURE — G0439 PPPS, SUBSEQ VISIT: HCPCS | Performed by: FAMILY MEDICINE

## 2024-11-22 RX ORDER — IBANDRONATE SODIUM 150 MG/1
150 TABLET, FILM COATED ORAL
Qty: 3 TABLET | Refills: 3 | Status: SHIPPED | OUTPATIENT
Start: 2024-11-22

## 2024-11-22 SDOH — SOCIAL STABILITY - SOCIAL INSECURITY: DEPENDENT RELATIVE NEEDING CARE AT HOME: Z63.6

## 2024-11-22 NOTE — ASSESSMENT & PLAN NOTE
Last BMI: 19.42.    BMI Readings from Last 15 Encounters:   11/22/24 19.42 kg/m²   08/19/24 20.03 kg/m²   07/22/24 20.03 kg/m²   06/27/24 20.03 kg/m²   06/11/24 19.59 kg/m²   04/15/24 20.32 kg/m²   10/13/23 19.08 kg/m²   06/06/23 18.63 kg/m²   05/06/23 19.08 kg/m²   04/13/23 20.00 kg/m²   10/06/22 19.90 kg/m²   06/07/22 20.55 kg/m²   04/08/22 20.71 kg/m²   08/25/21 19.82 kg/m²   06/08/21 20.51 kg/m²     Etiology: Multifactorial  Clinical Findings: Temporal wasting  Treatment Plan: Liberalize Diet  Reassess this in : 6 months     Orders:    [facility] Referral to Chronic Care Management (CCM)    Detailed, Mod Complex (26178)

## 2024-11-22 NOTE — ASSESSMENT & PLAN NOTE
Cholesterol shows Good control. Long term heart-healthy diet and lifestyle discussed and encouraged to reduce risk of cardiovascular disease.  9/25/2024: Cholesterol, Total 214 (H); HDL Cholesterol 74 (H); Triglycerides 57; LDL Cholesterol 130 (H)  Cholesterol Meds: pravastatin Tabs - 10 MG  stable  Continue with current treatment plan     Orders:    [facility] Referral to Chronic Care Management (CCM)    Detailed, Mod Complex (87159)

## 2024-11-22 NOTE — ASSESSMENT & PLAN NOTE
Thyroid shows Good control.   9/25/2024: TSH 0.745  Thryoid Meds: levothyroxine Tabs - 50 MCG well controlled current treatment plan effective, no change in therapy

## 2024-11-22 NOTE — PATIENT INSTRUCTIONS
Nilam Lantigua's SCREENING SCHEDULE   Tests on this list are recommended by your physician but may not be covered, or covered at this frequency, by your insurer.   Please check with your insurance carrier before scheduling to verify coverage.   PREVENTATIVE SERVICES FREQUENCY &  COVERAGE DETAILS LAST COMPLETION DATE   Diabetes Screening    Fasting Blood Sugar /  Glucose    One screening every 12 months if never tested or if previously tested but not diagnosed with pre-diabetes   One screening every 6 months if diagnosed with pre-diabetes Lab Results   Component Value Date    GLU 98 09/25/2024        Cardiovascular Disease Screening    Lipid Panel  Cholesterol  Lipoprotein (HDL)  Triglycerides Covered every 5 years for all Medicare beneficiaries without apparent signs or symptoms of cardiovascular disease Lab Results   Component Value Date    CHOLEST 214 (H) 09/25/2024    HDL 74 (H) 09/25/2024     (H) 09/25/2024    TRIG 57 09/25/2024         Electrocardiogram (EKG)   Covered if needed at Welcome to Medicare, and non-screening if indicated for medical reasons 08/31/2024      Ultrasound Screening for Abdominal Aortic Aneurysm (AAA) Covered once in a lifetime for one of the following risk factors   • Men who are 65-75 years old and have ever smoked   • Anyone with a family history -     Colorectal Cancer Screening  Covered for ages 50-85; only need ONE of the following:    Colonoscopy   Covered every 10 years    Covered every 2 years if patient is at high risk or previous colonoscopy was abnormal 08/23/2024    Health Maintenance   Topic Date Due   • Colorectal Cancer Screening  08/23/2026       Flexible Sigmoidoscopy   Covered every 4 years -    Fecal Occult Blood Test Covered annually -   Bone Density Screening    Bone density screening    Covered every 2 years after age 65 if diagnosed with risk of osteoporosis or estrogen deficiency.    Covered yearly for long-term glucocorticoid medication use (Steroids) Last  Dexa Scan:    XR DEXA BONE DENSITOMETRY (CPT=77080) 07/03/2024      No recommendations at this time   Pap and Pelvic    Pap   Covered every 2 years for women at normal risk; Annually if at high risk -  No recommendations at this time    Chlamydia Annually if high risk -  No recommendations at this time   Screening Mammogram    Mammogram     Recommend annually for all female patients aged 40 and older    One baseline mammogram covered for patients aged 35-39 -    No recommendations at this time    Immunizations    Influenza Covered once per flu season  Please get every year 10/25/2024  No recommendations at this time    Pneumococcal Each vaccine (Zkwampi25 & Stpikrdfu75) covered once after 65 Prevnar 13: 10/23/2018    Eaqbkyrlw57: 10/10/2013     No recommendations at this time    Hepatitis B One screening covered for patients with certain risk factors   -  No recommendations at this time    Tetanus Toxoid Not covered by Medicare Part B unless medically necessary (cut with metal); may be covered with your pharmacy prescription benefits -    Tetanus, Diptheria and Pertusis TD and TDaP Not covered by Medicare Part B -  No recommendations at this time    Zoster Not covered by Medicare Part B; may be covered with your pharmacy  prescription benefits 02/19/2014  No recommendations at this time

## 2024-11-22 NOTE — PROGRESS NOTES
Subjective:   Nilam Lantigua is a 79 year old female who presents for a Subsequent Annual Wellness visit (Pt already had Initial Annual Wellness) and scheduled follow up of multiple significant but stable problems.   Dogin well but caregiver stress.  with dementia and gradually worsening, good support network but lots of question    History/Other:   Fall Risk Assessment:   She has been screened for Falls and is low risk.      Cognitive Assessment:   She had a completely normal cognitive assessment - see flowsheet entries     Functional Ability/Status:   Nilam Lantigua has some abnormal functions as listed below:  She has Vision problems based on screening of functional status.       Depression Screening (PHQ):  PHQ-2 SCORE: 0  , done 11/22/2024   Feeling bad about yourself - or that you are a failure or have let yourself or your family down: 1    Trouble concentrating on things, such as reading the newspaper or watching television: 1    If you checked off any problems, how difficult have these problems made it for you to do your work, take care of things at home, or get along with other people?: Not difficult at all           Advanced Directives:   She does have a Living Will but we do NOT have it on file in Epic.    She has a Power of  for Health Care on file in [facility] [employer].  Discussed Advance Care Planning with patient (and family/surrogate if present). Standard forms made available to patient in After Visit Summary.      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    Current Medications:  Outpatient Medications Marked as Taking for the 24 encounter (Office Visit) with Bi Davis MD   Medication Sig    Ibandronate Sodium 150 MG Oral Tab Take 1 tablet (150 mg total) by mouth every 30 (thirty) days.    ALOE VERA CONCENTRATE OR     estradiol (ESTRACE) 0.1 MG/GM Vaginal Cream 1/2 gram twice weekly per vagina    pravastatin 10 MG Oral Tab Take 1 tablet (10 mg total) by mouth nightly.    levothyroxine 50 MCG Oral Tab Take 1 tablet (50 mcg total) by mouth daily.    Lutein-Zeaxanthin 25-5 MG Oral Cap     famoTIDine 20 MG Oral Tab Take 1 tablet (20 mg total) by mouth nightly as needed for Heartburn.    Coenzyme Q10 (CO Q 10) 100 MG Oral Cap Take by mouth.    Magnesium 200 MG Oral Tab Take by mouth.    Calcium Carbonate-Vitamin D (CALCIUM-D) 600-400 MG-UNIT Oral Tab Take by mouth.    Olopatadine HCl 0.1 % Ophthalmic Solution Place 1 drop into both eyes 2 (two) times daily.       Medical History:  She  has a past medical history of Arthritis, Atypical mole ( - On my back), Body piercing (Ears), Chest pain, Chronic cough (At night - only occasionally now), Easy bruising, Esophageal reflux, Eye disease (), Heartburn (5 yrs ago? ), Hemorrhoids, High cholesterol (Since I can remember), Hyperlipidemia, Leaking of urine (Rarely), Pain in joints (Both wrists), Stress (Occasionally), Thyroid disease, Uncomfortable fullness after meals, Wears glasses, and Weight loss ().  Surgical History:  She  has a past surgical history that includes nasal surg proc unlisted (); other surgical history (); colonoscopy with biopsy (13); upper gi endoscopy,biopsy (10/3/14); colonoscopy; and .   Family History:  Her family history includes Cancer in her father; Colon Cancer in her father;  Colon Polyps in her father; Stroke in her mother; ischemic stroke in her mother; pancreatic ca in her father.  Social History:  She  reports that she has never smoked. She has never used smokeless tobacco. She reports current alcohol use of about 1.0 - 2.0 standard drink of alcohol per week. She reports that she does not use drugs.    Tobacco:  She has never smoked tobacco.     CAGE Alcohol Screen:   CAGE screening score of 0 on 11/22/2024, showing low risk of alcohol abuse.      Patient Care Team:  Bi Davis MD as PCP - General (Family Practice)  Fuentes Aguilar MD (OTOLARYNGOLOGY)  Amor Ocampo MD (GASTROENTEROLOGY)  Dale Venegas (OPHTHALMOLOGY)  Kristal Trimble PT as Physical Therapist    Review of Systems   Constitutional: Negative.  Negative for activity change, appetite change, chills and fever.   HENT: Negative.     Eyes: Negative.    Respiratory: Negative.  Negative for shortness of breath.    Cardiovascular: Negative.  Negative for chest pain and palpitations.   Gastrointestinal: Negative.  Negative for abdominal pain.   Genitourinary: Negative.  Negative for dysuria.   Musculoskeletal:  Negative for arthralgias.   Skin: Negative.  Negative for rash.   Allergic/Immunologic: Negative.    Neurological: Negative.        Objective:   Physical Exam  Vitals and nursing note reviewed.   Constitutional:       General: She is not in acute distress.     Appearance: Normal appearance.   HENT:      Head: Normocephalic and atraumatic.      Right Ear: Tympanic membrane and external ear normal.      Left Ear: Tympanic membrane and external ear normal.      Nose: Nose normal.      Mouth/Throat:      Mouth: Mucous membranes are moist.   Eyes:      Extraocular Movements: Extraocular movements intact.      Pupils: Pupils are equal, round, and reactive to light.   Cardiovascular:      Rate and Rhythm: Normal rate and regular rhythm.      Pulses: Normal pulses.           Carotid pulses are 2+ on the right side and 2+ on  the left side.       Radial pulses are 2+ on the right side and 2+ on the left side.        Dorsalis pedis pulses are 2+ on the right side and 2+ on the left side.        Posterior tibial pulses are 2+ on the right side and 2+ on the left side.      Heart sounds: Normal heart sounds, S1 normal and S2 normal. No murmur heard.  Pulmonary:      Effort: Pulmonary effort is normal. No respiratory distress.      Breath sounds: Normal breath sounds. No wheezing.   Abdominal:      General: Abdomen is flat. Bowel sounds are normal. There is no distension.      Palpations: Abdomen is soft.      Tenderness: There is no abdominal tenderness.      Hernia: A hernia is present. Hernia is present in the left inguinal area and right inguinal area.   Musculoskeletal:         General: Normal range of motion.      Cervical back: Normal range of motion and neck supple.      Right lower leg: No edema.      Left lower leg: No edema.   Lymphadenopathy:      Lower Body: No right inguinal adenopathy. No left inguinal adenopathy.   Skin:     General: Skin is warm and dry.      Capillary Refill: Capillary refill takes less than 2 seconds.      Findings: No rash.   Neurological:      General: No focal deficit present.      Mental Status: She is alert and oriented to person, place, and time.   Psychiatric:         Mood and Affect: Mood normal.         Behavior: Behavior normal.         Thought Content: Thought content normal.         Judgment: Judgment normal.         /86   Pulse 80   Resp 14   Ht 5' 1\" (1.549 m)   Wt 102 lb 12.8 oz (46.6 kg)   BMI 19.42 kg/m²  Estimated body mass index is 19.42 kg/m² as calculated from the following:    Height as of this encounter: 5' 1\" (1.549 m).    Weight as of this encounter: 102 lb 12.8 oz (46.6 kg).    Medicare Hearing Assessment:   Hearing Screening    Screening Method: Whisper Test  Whisper Test Result: Pass         Visual Acuity:   Right Eye Visual Acuity: Corrected Right Eye Chart Acuity:  20/40   Left Eye Visual Acuity: Corrected Left Eye Chart Acuity: 20/40   Both Eyes Visual Acuity: Corrected Both Eyes Chart Acuity: 20/30   Able To Tolerate Visual Acuity: Yes        Assessment & Plan:   Nilam Lantigua is a 79 year old female who presents for a Medicare Assessment.     Assessment & Plan  Annual physical exam         Protein-calorie malnutrition, unspecified severity (HCC)  Last BMI: 19.42.    BMI Readings from Last 15 Encounters:   11/22/24 19.42 kg/m²   08/19/24 20.03 kg/m²   07/22/24 20.03 kg/m²   06/27/24 20.03 kg/m²   06/11/24 19.59 kg/m²   04/15/24 20.32 kg/m²   10/13/23 19.08 kg/m²   06/06/23 18.63 kg/m²   05/06/23 19.08 kg/m²   04/13/23 20.00 kg/m²   10/06/22 19.90 kg/m²   06/07/22 20.55 kg/m²   04/08/22 20.71 kg/m²   08/25/21 19.82 kg/m²   06/08/21 20.51 kg/m²     Etiology: Multifactorial  Clinical Findings: Temporal wasting  Treatment Plan: Liberalize Diet  Reassess this in : 6 months     Orders:    [facility] Referral to Chronic Care Management (CCM)    Detailed, Mod Complex (60256)    Mixed hyperlipidemia  Cholesterol shows Good control. Long term heart-healthy diet and lifestyle discussed and encouraged to reduce risk of cardiovascular disease.  9/25/2024: Cholesterol, Total 214 (H); HDL Cholesterol 74 (H); Triglycerides 57; LDL Cholesterol 130 (H)  Cholesterol Meds: pravastatin Tabs - 10 MG  stable  Continue with current treatment plan     Orders:    [facility] Referral to Chronic Care Management (CCM)    Detailed, Mod Complex (82148)    Osteopenia of multiple sites  7/3/2024 last dexa.stable, continue present management and continue to monitor for progression     Orders:    [facility] Referral to Chronic Care Management ([facility])    Ibandronate Sodium; Take 1 tablet (150 mg total) by mouth every 30 (thirty) days.  Dispense: 3 tablet; Refill: 3    Detailed, Mod Complex (14109)    Caregiver stress   with Alzheimer's, chronic care management makes a lot of sense.   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   9/25/2024: TSH 0.745  Thryoid Meds: levothyroxine Tabs - 50 MCG well controlled current treatment plan effective, no change in therapy             The patient indicates understanding of these issues and agrees to the plan.  Reinforced healthy diet, lifestyle, and exercise.      Return in about 6 months (around 5/22/2025) for recheck.     Bi Davis MD, 11/22/2024     Supplementary Documentation:   General Health:  In the past six months, have you lost more than 10 pounds without trying?: 2 - No  Has your appetite been poor?: No  Type of Diet: Balanced  How does the patient maintain a good energy level?: Appropriate Exercise;Other  How would you describe your daily physical activity?: Moderate  How would you describe your current health state?: Good  How do you maintain positive mental well-being?: Social Interaction;Visiting Friends;Visiting Family  On a scale of 0 to 10, with 0 being no pain and 10 being severe pain, what is your pain level?: 0 - (None)  In the past six months, have you experienced urine leakage?: 1-Yes  At any time do you feel concerned for the safety/well-being of yourself and/or your children, in your home or elsewhere?: No  Have you had any immunizations at another office such as Influenza, Hepatitis B, Tetanus, or Pneumococcal?: Yes    Health Maintenance   Topic Date Due    Annual Depression Screening  01/01/2024    HTN: BP Follow-Up  07/27/2024    COVID-19 Vaccine (7 - 2024-25 season) 09/01/2024    Influenza Vaccine (1) 10/01/2024    Annual Physical  10/13/2024    DEXA Scan   07/03/2026    Colorectal Cancer Screening  08/23/2026    Fall Risk Screening (Annual)  Completed    Pneumococcal Vaccine: 65+ Years  Completed    Zoster Vaccines  Completed

## 2024-11-22 NOTE — ASSESSMENT & PLAN NOTE
7/3/2024 last dexa.stable, continue present management and continue to monitor for progression     Orders:    [facility] Referral to Chronic Care Management (CCM)    Ibandronate Sodium; Take 1 tablet (150 mg total) by mouth every 30 (thirty) days.  Dispense: 3 tablet; Refill: 3    Detailed, Mod Complex (38767)

## 2024-11-22 NOTE — ASSESSMENT & PLAN NOTE
Stable, continue present management and continue to monitor for progression           Do not drive or operate machinery

## 2024-12-04 ENCOUNTER — PATIENT OUTREACH (OUTPATIENT)
Dept: CASE MANAGEMENT | Age: 79
End: 2024-12-04

## 2024-12-04 ENCOUNTER — HOSPITAL ENCOUNTER (OUTPATIENT)
Dept: MRI IMAGING | Facility: HOSPITAL | Age: 79
Discharge: HOME | End: 2024-12-04
Attending: FAMILY MEDICINE
Payer: MEDICARE

## 2024-12-04 DIAGNOSIS — Z98.82 HISTORY OF BILATERAL BREAST IMPLANTS: ICD-10-CM

## 2024-12-04 DIAGNOSIS — D48.62 NEOPLASM OF UNCERTAIN BEHAVIOR OF LEFT BREAST: ICD-10-CM

## 2024-12-04 DIAGNOSIS — R07.89 RIGHT-SIDED CHEST WALL PAIN: ICD-10-CM

## 2024-12-04 PROCEDURE — 77047 MRI BREAST C- BILATERAL: CPT | Performed by: FAMILY MEDICINE

## 2024-12-04 PROCEDURE — 77049 MRI BREAST C-+ W/CAD BI: CPT | Performed by: FAMILY MEDICINE

## 2024-12-04 PROCEDURE — A9575 INJ GADOTERATE MEGLUMI 0.1ML: HCPCS | Performed by: FAMILY MEDICINE

## 2024-12-04 RX ORDER — DIPHENHYDRAMINE HYDROCHLORIDE 50 MG/ML
10 INJECTION, SOLUTION INTRAMUSCULAR; INTRAVENOUS
Status: COMPLETED | OUTPATIENT
Start: 2024-12-04 | End: 2024-12-04

## 2024-12-04 RX ADMIN — DIPHENHYDRAMINE HYDROCHLORIDE 10 ML: 50 INJECTION, SOLUTION INTRAMUSCULAR; INTRAVENOUS at 20:11:00

## 2024-12-10 DIAGNOSIS — E04.1 NONTOXIC UNINODULAR GOITER: ICD-10-CM

## 2024-12-11 RX ORDER — LEVOTHYROXINE SODIUM 50 UG/1
50 TABLET ORAL DAILY
Qty: 90 TABLET | Refills: 3 | Status: SHIPPED | OUTPATIENT
Start: 2024-12-11

## 2024-12-11 NOTE — TELEPHONE ENCOUNTER
Requested Prescriptions     Signed Prescriptions Disp Refills    LEVOTHYROXINE 50 MCG Oral Tab 90 tablet 3     Sig: TAKE 1 TABLET(50 MCG) BY MOUTH DAILY     Authorizing Provider: ML PENA     Ordering User: PATRICIA TOPETE      Refilled per protocol/OV notes

## 2024-12-11 NOTE — PROGRESS NOTES
Patient identified with a potential need for Chronic Care Management services.  Called patient to introduce self and availability of Chronic Care Management services.  Patient informed about the following service elements:  Health information sharing - complying with all laws related to privacy and security of their health information  Patient/Insurance Cost sharing - includes deductible and any ongoing copays and/or coinsurance  Only one provider can bill for this service monthly  Patient right to discontinue services at any time for any reason effective last day of current month  Patient verbally accepts to participate in Chronic Care Management services and any associated charges.

## 2024-12-16 ENCOUNTER — PATIENT OUTREACH (OUTPATIENT)
Dept: CASE MANAGEMENT | Age: 79
End: 2024-12-16

## 2024-12-16 NOTE — PROGRESS NOTES
KIANA verified for CCM outreach.  I called patient and left a detailed message to call back.   I will follow up with patient at a later time.      Medical record reviewed including recent office visits and test results

## 2025-01-17 ENCOUNTER — PATIENT OUTREACH (OUTPATIENT)
Dept: CASE MANAGEMENT | Age: 80
End: 2025-01-17

## 2025-01-17 NOTE — PROGRESS NOTES
Spoke to Nilam for Chronic Care Management.      Updates to patient care team/comments: UTD   Patient reported changes in medications: reports no changes   Med Adherence  Comment: reports adherence      Health Maintenance:   Health Maintenance   Topic Date Due    Annual Depression Screening  01/01/2025    Fall Risk Screening (Annual)  01/01/2025    Annual Physical  11/22/2025    DEXA Scan  07/03/2026    Colorectal Cancer Screening  08/23/2026    Influenza Vaccine  Completed    Pneumococcal Vaccine: 50+ Years  Completed    Zoster Vaccines  Completed    COVID-19 Vaccine  Completed    Meningococcal B Vaccine  Aged Out       Patient updates/concerns:   Spoke with patient.  Patient relates doing well. Mood good/stable.  Denies any recent falls. Husbands health slowly improving. Weight stable. Continues to stay active. Breathing stable. Denies any urinary issues. Currently our running errands. No other questions or concerns.    Encouraged patient:   Self care: Take the time to do the things you love.   Nutrition:  Good nutrition helps us to maintain our weight, fight off infection, and help reduce the risk of developing other chronic issues.   Physical activity:  Physical activity is important to help maintain independence and improve quality of life.       Goals/Action Plan:    Active goal from previous outreach: Staying healthy, maintain independence, and stability.     Patient reported progress towards goals: progressing                - What: continues to follow up on health conditions            - Where/When/How: seeing PCP and specialist.   Patient Reported Barriers and Concerns: as per above                    - Plan for overcoming barriers: encouraged patient to call with any questions or concerns .    Care Managers Interventions: Continue to provide encouragement and education for healthy coping and diagnosis.    Future Appointments:   Future Appointments   Date Time Provider Department Center   3/21/2025 12:30  PM Asael [facility] MD [id number]   5/14/2025 11:00 AM Bi Davis MD [facility]   7/15/2025  1:30 PM Elida Olson DO [facility] None         Next Care Manager Follow Up Date: 1 month     Reason For Follow Up: review progress and or barriers towards patient's goals.     Time Spent This Encounter Total: 16 min medical record review, telephone communication, care plan updates where needed, education, goals, and action plan recreation/update. Provided acknowledgment and validation to patient's concerns.   Monthly Minute Total including today: 16   Physical assessment, complete health history, and need for CCM established by Bi Davis MD.

## 2025-02-24 ENCOUNTER — PATIENT OUTREACH (OUTPATIENT)
Dept: CASE MANAGEMENT | Age: 80
End: 2025-02-24

## 2025-02-24 NOTE — PROGRESS NOTES
Spoke to Nilam for Chronic Care Management.      Updates to patient care team/comments: UTD  Patient reported changes in medications: reports no changes   Med Adherence  Comment: reports adherence      Health Maintenance:   Health Maintenance   Topic Date Due    Annual Depression Screening  01/01/2025    Fall Risk Screening (Annual)  01/01/2025    COVID-19 Vaccine (8 - 2024-25 season) 04/25/2025    Annual Physical  11/22/2025    DEXA Scan  07/03/2026    Colorectal Cancer Screening  08/23/2026    Influenza Vaccine  Completed    Pneumococcal Vaccine: 50+ Years  Completed    Zoster Vaccines  Completed    Meningococcal B Vaccine  Aged Out       Patient updates/concerns:   Spoke with patient.  Patient relates doing well. Mood good/stable.  Denies any recent falls. Husbands health continues to improve. Will be doing radioactive therapy and has concerns with care taking duties of assisting with cath and radioactive exposure. Called Dr. Yang and they will call patient. Weight stable. Continues to stay active. Breathing stable. Denies any urinary issues. Currently driving home. No other questions or concerns.     Encouraged patient:   Self care: Take the time to do the things you love.   Nutrition:  Good nutrition helps us to maintain our weight, fight off infection, and help reduce the risk of developing other chronic issues.   Physical activity:  Physical activity is important to help maintain independence and improve quality of life.     Goals/Action Plan:    Active goal from previous outreach: Staying healthy, maintain independence, and stability.    Patient reported progress towards goals: progressing                - What: continues to follow up on health conditions            - Where/When/How: seeing PCP and specialist   Patient Reported Barriers and Concerns: as per above                    - Plan for overcoming barriers: encouraged patient to call with any questions or concerns.     Care Managers Interventions:  Continue to provide encouragement and education for healthy coping and diagnosis.      Future Appointments:   Future Appointments   Date Time Provider Department Center   3/21/2025 12:30 PM Asael [facility] MD [facility]   5/14/2025 11:00 AM Bi Davis MD [facility]   7/15/2025  1:30 PM Elida Olson DO [facility] None         Next Care Manager Follow Up Date: 1 month     Reason For Follow Up: review progress and or barriers towards patient's goals.     Time Spent This Encounter Total: 18 min medical record review, telephone communication, care plan updates where needed, education, goals, and action plan recreation/update. Provided acknowledgment and validation to patient's concerns.   Monthly Minute Total including today: 18 min   Physical assessment, complete health history, and need for CCM established by Bi Davis MD.

## 2025-03-13 DIAGNOSIS — E78.2 MIXED HYPERLIPIDEMIA: ICD-10-CM

## 2025-03-13 RX ORDER — PRAVASTATIN SODIUM 10 MG
10 TABLET ORAL NIGHTLY
Qty: 90 TABLET | Refills: 3 | Status: SHIPPED | OUTPATIENT
Start: 2025-03-13

## 2025-03-13 NOTE — TELEPHONE ENCOUNTER
Requested Prescriptions     Pending Prescriptions Disp Refills    PRAVASTATIN 10 MG Oral Tab [Pharmacy Med Name: PRAVASTATIN 10MG TABLETS] 90 tablet 3     Sig: TAKE 1 TABLET(10 MG) BY MOUTH EVERY NIGHT     LOV 11/22/2024     Patient was asked to follow-up in: 6 months    Appointment scheduled: 5/14/2025 Bi Davis MD     Medication refilled per protocol.

## 2025-03-14 ENCOUNTER — PATIENT OUTREACH (OUTPATIENT)
Dept: CASE MANAGEMENT | Age: 80
End: 2025-03-14

## 2025-03-14 NOTE — PROGRESS NOTES
KIANA verified for CCM monthly outreach.   I called patient and left a detailed message to call back.   I will follow up with patient at a later time.      Medical record reviewed including recent office visits and test results

## 2025-03-21 ENCOUNTER — OFFICE VISIT (OUTPATIENT)
Dept: SURGERY | Facility: CLINIC | Age: 80
End: 2025-03-21
Payer: MEDICARE

## 2025-03-21 VITALS
TEMPERATURE: 98 F | OXYGEN SATURATION: 96 % | HEART RATE: 85 BPM | HEIGHT: 60.24 IN | BODY MASS INDEX: 19.57 KG/M2 | DIASTOLIC BLOOD PRESSURE: 83 MMHG | RESPIRATION RATE: 16 BRPM | SYSTOLIC BLOOD PRESSURE: 146 MMHG | WEIGHT: 101 LBS

## 2025-03-21 DIAGNOSIS — Z98.82 H/O BREAST AUGMENTATION: Primary | ICD-10-CM

## 2025-03-21 PROCEDURE — 99203 OFFICE O/P NEW LOW 30 MIN: CPT | Performed by: SURGERY

## 2025-03-21 NOTE — CONSULTS
New Patient Consultation    This is the first visit for this 79 year old female presents for evaluation of her breast implants.    History of Present Illness:   The patient is a 79 year old female female referred by Dr. Davis for evaluation of her cosmetic breast implants.  The patient has a history of bilateral subglandular breast augmentation in .  The patient reports hard painful breast implants bilaterally.  Patient underwent breast MRI in December which revealed densely calcified subglandular implants bilaterally.  An intracapsular rupture was noted on the right side.  Enlarged left axillary lymph node at the upper limits of normal is noted.  The patient was referred here to discuss surgical treatment options for her ruptured breast implant.    Past Medical History:      Past Medical History:    Arthritis    Atypical mole    Had them checked once - No problem    Body piercing    Chest pain    Chronic cough    It's why I take Ranitidine and Diphenhydramine    Easy bruising    Esophageal reflux    Eye disease    Cataracts forming - no impeded vision yet    Heartburn    Hemorrhoids    High cholesterol    Hyperlipidemia    Leaking of urine    Pain in joints    Dental Hygienist and broke bone.    Stress    Figuring out computer - ha!    Thyroid disease    Uncomfortable fullness after meals    Wears glasses    Weight loss    Slight - since I put my  on a diet.         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    Upper gi endoscopy,biopsy  10/3/14    atrial gastritis         Medications:    Medications Ordered Prior to Encounter[1]      Allergies:    Allergies[2]      Family History:   Family History   Problem Relation Age of Onset    Cancer Father          Pancreatic age 99    Colon Cancer Father     Colon Polyps Father     Other (pancreatic ca) Father     Other (ischemic stroke) Mother     Stroke Mother     Other (Other) Brother          Social History:  History   Alcohol Use    1.0 - 2.0 standard drink of alcohol/week    1 - 2 Standard drinks or equivalent per week     Comment: 1 - 2  servings a week       History   Smoking Status    Never   Smokeless Tobacco    Never       History   Drug Use Unknown           Review of Systems:    General:   The patient denies, fever, chills, night sweats, fatigue, generalized weakness, change in appetite, weight loss, or weight gain.    Endocrine:   There is no history of poor/slow wound healing, weight loss/gain, fertility or hormone problems, cold intolerance, heat intolerance, excessive thirst, or thyroid disease.     Allergic/Immunologic:  There is no history of hives, hay fever, angioedema or anaphylaxis.    HEENT:    The patient denies ear pain, ear drainage, hearing loss, change in vision, double vision, cataracts, glaucoma, nasal congestion, nosebleed, hoarseness, sore throat, or swollen glands    Respiratory:   The patient denies shortness of breath, cough, bloody cough, phlegm, asthma, or wheezing    Cardiovascular:  The patient denies chest pain/pressure, palpitations, irregular heartbeat, high blood pressure, stroke, or leg swelling    Breasts:  Patient denies breast masses, pain, change in the breast skin, skin dimpling, nipple discharge, or rash    Gastrointestinal:   There is no history of difficulty or pain with swallowing, reflux symptoms, nausea, vomiting, dark/ bloody stools, diarrhea, constipation,  change in bowel habits, or abdominal pain.     Genitourinary:  The patient denies frequent urination, needing to get up at night to urinate, urinary hesitancy or retaining urine, painful urination, urinary incontinence, decreased urine stream, blood in the urine, or vaginal/penile discharge.    Skin:   The patient  denies rash, itching, skin lesions, dry skin, change in skin color or change in moles, sunburns, or sunburns with blistering.     Hematologic/Lymphatic:  The patient denies easily bruising or bleeding, persistent swollen glands or lymph nodes, bleeding disorders, blood clots, or pulmonary embolism.     Gynecologic:  The patient denies irregular menses, pelvic pain, pain with intercourse, painful menses, or pregnancy    Musculoskeletal:  The patient denies muscle aches/pain, joint pain, stiff joints, neck pain, back pain or bone pain.    Neurologic:  There is no history of migraines or severe headaches, seizure/epilepsy, speech problems, coordination problems, trembling/tremors, fainting/black outs, dizziness, memory problems, loss of sensation/numbness, problems walking, weakness, tingling or burning in hands/feet.     Psychiatric:  There is no history of abusive relationship, bipolar disorder, sleep disturbance, anxiety, depression or feeling of despair.    Physical Exam:    /83 (BP Location: Right arm, Patient Position: Sitting, Cuff Size: adult)   Pulse 85   Temp 98 °F (36.7 °C) (Tympanic)   Resp 16   Ht 1.53 m (5' 0.24\")   Wt 45.8 kg (101 lb)   SpO2 96%   BMI 19.57 kg/m²     The patient is awake, alert, and oriented.  She is a well-nourished, well-developed female who appears her stated age.  Her speech patterns and movements are normal, and affect is appropriate.    HEENT: The head is normocephalic. The neck is supple. The thyroid is not enlarged and is without palpable masses.  The trachea is in the midline. Conjunctiva are clear, non-icteric.    Breasts: Hard calcified capsules are noted bilaterally which are tender to palpation.  Well-healed inframammary fold scars are noted bilaterally.  There are no dominant breast masses noted bilaterally.  A paucity of breast parenchyma and subcutaneous fat is noted throughout.    Lymph Nodes:  There is no cervical, supraclavicular, or axillary  lymphadenopathy appreciated.    Back: There is no vertebral column tenderness.    Skin: The skin appears normal. There are no suspicious appearing rashes or lesions.    Extremities: The extremities are without deformity, cyanosis or edema.    Impression:   The patient is a 79 year old female with history of bilateral subglandular breast augmentation who now presents with grade 4 capsular contracture and radiographic evidence of implant rupture.    Discussion and Plan:  Surgical treatment options were reviewed with the patient.  Specifically, we discussed capsulectomy and implant removal.  Given the patient's desires, we also discussed the option of capsulectomy and removal of her ruptured implants with pocket change or a submuscular position with biosynthetic mesh placement.  The nature of each procedure was reviewed with the patient.  The risk, benefits, and alternatives were discussed.  The patient wishes to submit to insurance for the implant removal.  She will be provided a quote for the implant exchange and mesh placement.  She would then return for preoperative visit if she wished to proceed with surgery.  The plan was reviewed with the patient and questions were answered.         [1]   Current Outpatient Medications on File Prior to Visit   Medication Sig Dispense Refill    PRAVASTATIN 10 MG Oral Tab TAKE 1 TABLET(10 MG) BY MOUTH EVERY NIGHT 90 tablet 3    LEVOTHYROXINE 50 MCG Oral Tab TAKE 1 TABLET(50 MCG) BY MOUTH DAILY 90 tablet 3    Ibandronate Sodium 150 MG Oral Tab Take 1 tablet (150 mg total) by mouth every 30 (thirty) days. 3 tablet 3    ALOE VERA CONCENTRATE OR       estradiol (ESTRACE) 0.1 MG/GM Vaginal Cream 1/2 gram twice weekly per vagina 42.5 g 3    Lutein-Zeaxanthin 25-5 MG Oral Cap       famoTIDine 20 MG Oral Tab Take 1 tablet (20 mg total) by mouth nightly as needed for Heartburn.      Coenzyme Q10 (CO Q 10) 100 MG Oral Cap Take by mouth.      Magnesium 200 MG Oral Tab Take by mouth.       Calcium Carbonate-Vitamin D (CALCIUM-D) 600-400 MG-UNIT Oral Tab Take by mouth.      Olopatadine HCl 0.1 % Ophthalmic Solution Place 1 drop into both eyes 2 (two) times daily. 15 mL 3     No current facility-administered medications on file prior to visit.   [2]   Allergies  Allergen Reactions    Codeine ANAPHYLAXIS     derivatives    Flonase [Fluticasone] OTHER (SEE COMMENTS)     Trouble Sleeping

## 2025-03-27 ENCOUNTER — TELEPHONE (OUTPATIENT)
Dept: SURGERY | Facility: CLINIC | Age: 80
End: 2025-03-27

## 2025-03-27 NOTE — TELEPHONE ENCOUNTER
Pt had some additional questions regarding her cosmetic quote and options for her bilateral implant removal. Pt would like to submit the ruptured implant removal to Medicare and get a quote for the exchange with mesh and a quote for just the removal with no replacement.   Told pt I will send that to her via [employer] for her to review, and if she had any questions to let me know and I would call her back to go over those with her.  Pt also wanted me to see what would happen if she left the implants as is, for which I explained I would forward that question to the RN and have her reach out to discuss that further.  Pt verbalized understanding of the plan and had no other questions for me at this time.

## 2025-03-28 ENCOUNTER — TELEPHONE (OUTPATIENT)
Dept: FAMILY MEDICINE CLINIC | Facility: CLINIC | Age: 80
End: 2025-03-28

## 2025-03-28 ENCOUNTER — TELEPHONE (OUTPATIENT)
Dept: SURGERY | Facility: CLINIC | Age: 80
End: 2025-03-28

## 2025-03-28 NOTE — TELEPHONE ENCOUNTER
Spoke w/pt and let her know she could try OTC medications like pepto or immodium but pt is concerned most about being contagious to  that is currently admitted. Let her know that she could be seen at [facility]IC or IC to rule out but there are no openings today. Pt verbalized agreement and understanding.

## 2025-03-28 NOTE — TELEPHONE ENCOUNTER
Called patient to discuss questions she expressed to Yoselin, .   Patient inquiring about the risks of not addressing her ruptured implant. Explained that it is always recommended to have ruptured implants replaced, and that her ruptured implant could potentially be contributing to her pain. Patient verbalized understanding. As her insurance has approved the removal of her right breast implant, she would like a cosmetic quote for left breast implant removal and bilateral breast implant exchange. Explained that these quotes were provided to her via [employer] for her review. Patient will reach back our once she has had a chance to review and if she would like to proceed.

## 2025-03-28 NOTE — TELEPHONE ENCOUNTER
Pt is calling because she has been having stomach issues and stomach gurgling and wants to know what she should do for this

## 2025-04-01 ENCOUNTER — TELEPHONE (OUTPATIENT)
Dept: FAMILY MEDICINE CLINIC | Facility: CLINIC | Age: 80
End: 2025-04-01

## 2025-04-02 NOTE — PATIENT INSTRUCTIONS
Rachelle East is calling to request a refill on the following medication(s):    Medication Request:  Requested Prescriptions     Pending Prescriptions Disp Refills    isosorbide mononitrate (IMDUR) 30 MG extended release tablet [Pharmacy Med Name: ISOSORBIDE MONONIT ER 30 MG TB] 90 tablet 3     Sig: TAKE 1 TABLET BY MOUTH EVERY MORNING       Last Visit Date (If Applicable):  1/28/2025    Next Visit Date:    5/12/2025               Mj Lantigua's SCREENING SCHEDULE   Tests on this list are recommended by your physician but may not be covered, or covered at this frequency, by your insurer. Please check with your insurance carrier before scheduling to verify coverage.    PREVENTATIVE Visit    Abdominal aortic aneurysm screening (once between ages 73-68) IPPE only No results found for this or any previous visit.  Limited to patients who meet one of the following criteria:   • Men who are 73-68 years old and have smoked more than 100 ciga Mammogram      Mammogram    Recommend Annually to at least age 76, and as needed after 76 Mammogram due on 08/24/2018 Please get this Mammogram regularly   Immunizations      Influenza  Covered Annually   Orders placed or performed in visit on 10/10/13  -I review and print using their home computer and printer. (the forms are also available in [facility])  [email or web address]  This link also has information from the [facility] regarding Advance Directives.

## 2025-04-16 ENCOUNTER — PATIENT OUTREACH (OUTPATIENT)
Dept: CASE MANAGEMENT | Age: 80
End: 2025-04-16

## 2025-05-13 NOTE — ASSESSMENT & PLAN NOTE
Cholesterol shows Good control. Long term heart-healthy diet and lifestyle discussed and encouraged to reduce risk of cardiovascular disease.  9/25/2024: Cholesterol, Total 214 (H); HDL Cholesterol 74 (H); Triglycerides 57; LDL Cholesterol 130 (H)  Cholesterol Meds: pravastatin Tabs - 10 MG  stable  Continue with current treatment plan

## 2025-05-13 NOTE — ASSESSMENT & PLAN NOTE
Last BMI: 19.14.    BMI Readings from Last 15 Encounters:   05/14/25 19.14 kg/m²   03/21/25 19.57 kg/m²   11/22/24 19.42 kg/m²   08/19/24 20.03 kg/m²   07/22/24 20.03 kg/m²   06/27/24 20.03 kg/m²   06/11/24 19.59 kg/m²   04/15/24 20.32 kg/m²   10/13/23 19.08 kg/m²   06/06/23 18.63 kg/m²   05/06/23 19.08 kg/m²   04/13/23 20.00 kg/m²   10/06/22 19.90 kg/m²   06/07/22 20.55 kg/m²   04/08/22 20.71 kg/m²     Etiology: Behavioral  Clinical Findings: Temporal wasting  Treatment Plan: Liberalize Diet  Reassess this in : 6 months

## 2025-05-13 NOTE — ASSESSMENT & PLAN NOTE
Seen 2017 CT. Stable, continue present management and continue to monitor for progression

## 2025-05-13 NOTE — ASSESSMENT & PLAN NOTE
7/3/2024 last dexa, will repeat next year. Stable, continue present management and continue to monitor for progression

## 2025-05-14 ENCOUNTER — OFFICE VISIT (OUTPATIENT)
Dept: FAMILY MEDICINE CLINIC | Facility: CLINIC | Age: 80
End: 2025-05-14
Payer: MEDICARE

## 2025-05-14 VITALS
WEIGHT: 98.81 LBS | DIASTOLIC BLOOD PRESSURE: 70 MMHG | BODY MASS INDEX: 19.15 KG/M2 | HEIGHT: 60.25 IN | OXYGEN SATURATION: 95 % | RESPIRATION RATE: 14 BRPM | SYSTOLIC BLOOD PRESSURE: 126 MMHG | HEART RATE: 78 BPM

## 2025-05-14 DIAGNOSIS — E04.1 NONTOXIC UNINODULAR GOITER: ICD-10-CM

## 2025-05-14 DIAGNOSIS — M85.89 OSTEOPENIA OF MULTIPLE SITES: ICD-10-CM

## 2025-05-14 DIAGNOSIS — E46 PROTEIN-CALORIE MALNUTRITION, UNSPECIFIED SEVERITY (HCC): Primary | ICD-10-CM

## 2025-05-14 DIAGNOSIS — E78.2 MIXED HYPERLIPIDEMIA: ICD-10-CM

## 2025-05-14 DIAGNOSIS — I70.0 ATHEROSCLEROSIS OF AORTA: ICD-10-CM

## 2025-05-14 DIAGNOSIS — Z63.6 CAREGIVER STRESS: ICD-10-CM

## 2025-05-14 PROCEDURE — G2211 COMPLEX E/M VISIT ADD ON: HCPCS | Performed by: FAMILY MEDICINE

## 2025-05-14 PROCEDURE — 99214 OFFICE O/P EST MOD 30 MIN: CPT | Performed by: FAMILY MEDICINE

## 2025-05-14 RX ORDER — SENNOSIDES 8.6 MG
CAPSULE ORAL
COMMUNITY
Start: 2024-09-01

## 2025-05-14 SDOH — SOCIAL STABILITY - SOCIAL INSECURITY: DEPENDENT RELATIVE NEEDING CARE AT HOME: Z63.6

## 2025-05-14 NOTE — PROGRESS NOTES
The following individual(s) verbally consented to be recorded using ambient AI listening technology and understand that they can each withdraw their consent to this listening technology at any point by asking the clinician to turn off or pause the recording:    Patient name: Nilam Lantigua

## 2025-05-14 NOTE — PROGRESS NOTES
Subjective:   Nilam is a 79 year old female coming in for had concerns including Blood Pressure (6 months follow up ) and Eye Problem (When bending over right eye runs ).   HPI  History of Present Illness  Nilam Lantigua is an 79 year old female who presents with tearing of the right eye when bending over.    She experiences excessive tearing in her right eye when bending over, particularly when washing floors. This symptom is isolated to the right eye and has been persistent, affecting her ability to see clearly during these episodes. She describes the sensation as if 'it's connected somewhere.'    She is currently caring for her , who is on hospice at home. She uses a Ab[employer] lift and receives support from hospice services, which she finds beneficial. She is concerned about leaving him alone for short periods to run errands, as he is unable to turn himself and requires assistance. Her 's significant pain in his left leg persists despite previous x-rays in February. He receives Tylenol in the morning and Advil at noon, but is not given hydrocodone at night.    She monitors her blood pressure regularly, noting that her diastolic pressure tends to be high, but recent readings were more typical for her at 126/70.    She is currently on Ibandronate therapy, which she started in December, and plans to continue for another year. She also takes Synthroid (levothyroxine) and has a routine for taking her medications early in the morning.     Doing OK.    in Hospice    /70   Pulse 78   Resp 14   Ht 5' 0.25\" (1.53 m)   Wt 98 lb 12.8 oz (44.8 kg)   SpO2 95%   BMI 19.14 kg/m²  Body mass index is 19.14 kg/m².   Physical Exam  Vitals and nursing note reviewed.   Constitutional:       General: She is not in acute distress.     Appearance: Normal appearance. She is well-developed.   HENT:      Head: Normocephalic and atraumatic.      Right Ear: Tympanic membrane and external ear normal.      Left Ear:  Tympanic membrane and external ear normal.      Nose: Nose normal.      Mouth/Throat:      Mouth: Mucous membranes are moist.   Eyes:      Extraocular Movements: Extraocular movements intact.      Pupils: Pupils are equal, round, and reactive to light.   Cardiovascular:      Rate and Rhythm: Normal rate and regular rhythm.      Pulses: Normal pulses.           Carotid pulses are 2+ on the right side and 2+ on the left side.       Radial pulses are 2+ on the right side and 2+ on the left side.        Dorsalis pedis pulses are 2+ on the right side and 2+ on the left side.        Posterior tibial pulses are 2+ on the right side and 2+ on the left side.      Heart sounds: Normal heart sounds, S1 normal and S2 normal. No murmur heard.  Pulmonary:      Effort: Pulmonary effort is normal. No respiratory distress.      Breath sounds: Normal breath sounds. No wheezing.   Abdominal:      General: Abdomen is flat. Bowel sounds are normal. There is no distension.      Palpations: Abdomen is soft.      Tenderness: There is no abdominal tenderness.   Musculoskeletal:         General: Normal range of motion.      Cervical back: Normal range of motion and neck supple.      Right lower leg: No edema.      Left lower leg: No edema.   Skin:     General: Skin is warm and dry.      Capillary Refill: Capillary refill takes less than 2 seconds.      Findings: No rash.   Neurological:      General: No focal deficit present.      Mental Status: She is alert and oriented to person, place, and time.   Psychiatric:         Mood and Affect: Mood normal.         Behavior: Behavior normal.         Thought Content: Thought content normal.         Judgment: Judgment normal.        Physical Exam  VITALS: BP- 126/70        Results  RADIOLOGY  Left knee X-ray: Normal (02/2025)     Assessment & Plan  Protein-calorie malnutrition, unspecified severity (HCC)  Last BMI: 19.14.    BMI Readings from Last 15 Encounters:   05/14/25 19.14 kg/m²   03/21/25  19.57 kg/m²   11/22/24 19.42 kg/m²   08/19/24 20.03 kg/m²   07/22/24 20.03 kg/m²   06/27/24 20.03 kg/m²   06/11/24 19.59 kg/m²   04/15/24 20.32 kg/m²   10/13/23 19.08 kg/m²   06/06/23 18.63 kg/m²   05/06/23 19.08 kg/m²   04/13/23 20.00 kg/m²   10/06/22 19.90 kg/m²   06/07/22 20.55 kg/m²   04/08/22 20.71 kg/m²     Etiology: Behavioral  Clinical Findings: Temporal wasting  Treatment Plan: Liberalize Diet  Reassess this in : 6 months          Mixed hyperlipidemia  Cholesterol shows Good control. Long term heart-healthy diet and lifestyle discussed and encouraged to reduce risk of cardiovascular disease.  9/25/2024: Cholesterol, Total 214 (H); HDL Cholesterol 74 (H); Triglycerides 57; LDL Cholesterol 130 (H)  Cholesterol Meds: pravastatin Tabs - 10 MG  stable  Continue with current treatment plan          Nontoxic uninodular goiter  Thyroid shows Good control.   9/25/2024: TSH 0.745  Thryoid Meds: levothyroxine Tabs - 50 MCG well controlled current treatment plan effective, no change in therapy          Osteopenia of multiple sites  7/3/2024 last dexa, will repeat next year. Stable, continue present management and continue to monitor for progression          Atherosclerosis of aorta  Seen 2017 CT. Stable, continue present management and continue to monitor for progression          Caregiver stress   in hospice, working on help but she seems to be coping well but just has lots to do at this traumatic time.                Assessment & Plan  Epiphora of right eye  Tearing when bending over, likely related to lacrimal duct. No major workup needed.    Caregiver stress syndrome  Experiencing stress from caregiving duties. Hospice services providing support.  - Discuss with hospice about additional support services, such as housekeeping or temporary care assistance.    Hypertension  Blood pressure well-controlled at 126/70 mmHg. Diastolic pressure occasionally perceived as high but within normal range.    Thyroid  disorder  Thyroid function stable. Occasional non-persistent pressure.  - Continue current thyroid medication regimen.  - Follow-up in six months for wellness visit.    Osteoporosis  On Ibandronate therapy since December, plan to continue for 2-4 years.  - Continue Ibandronate therapy for at least another year.  I am having Nilam Lantigua maintain her olopatadine, Co Q 10, Magnesium, Calcium-D, famotidine, Lutein-Zeaxanthin, estradiol, ALOE VERA CONCENTRATE OR, Ibandronate Sodium, levothyroxine, pravastatin, and Acetaminophen ER.       Return in about 6 months (around 11/14/2025) for AWV with chonic condition follow up.

## 2025-05-15 ENCOUNTER — PATIENT OUTREACH (OUTPATIENT)
Dept: CASE MANAGEMENT | Age: 80
End: 2025-05-15

## 2025-07-08 ENCOUNTER — TELEPHONE (OUTPATIENT)
Dept: UROLOGY | Facility: CLINIC | Age: 80
End: 2025-07-08

## 2025-07-15 ENCOUNTER — OFFICE VISIT (OUTPATIENT)
Dept: UROLOGY | Facility: CLINIC | Age: 80
End: 2025-07-15
Attending: OBSTETRICS & GYNECOLOGY
Payer: MEDICARE

## 2025-07-15 VITALS — TEMPERATURE: 98 F | BODY MASS INDEX: 19 KG/M2 | RESPIRATION RATE: 16 BRPM | WEIGHT: 98 LBS

## 2025-07-15 DIAGNOSIS — N81.2 UTEROVAGINAL PROLAPSE, INCOMPLETE: Primary | ICD-10-CM

## 2025-07-15 DIAGNOSIS — N95.2 POSTMENOPAUSAL ATROPHIC VAGINITIS: ICD-10-CM

## 2025-07-15 PROCEDURE — 99212 OFFICE O/P EST SF 10 MIN: CPT

## 2025-07-15 NOTE — PROGRESS NOTES
Patient presents to follow up bulge    She is currently using vag estrogen    Went to PT in the past, doing pelvic exercises  No UTIs  Bulge sx worsening       Considering bulge mgmt options    Bowels reg  Some UUI in AM    Temp 97.7 °F (36.5 °C)   Resp 16   Wt 98 lb (44.5 kg)   BMI 18.98 kg/m²     GEN: NAD  CV: RRR  Pulm: nl effort  Abd: soft    PELVIC EXAM:  Ext. Gen: +atrophy, no lesions  Urethra: +atrophy, nontender  Bladder:+fullness, nontender  Vagina: +atrophy  Cervix: no bleeding, no lesions, nontender  Uterus: +mobile  Adnexa:no masses, nontender  Perineum: nontender  Anus: wnl  Rectum: defer     PELVIS FLOOR NEUROMUSCULAR FUNCTION:  Strength:  2 and Unable to hold greater than 3 sec  Perineal Sensation:  Normal     PELVIC SUPPORT:  [place]:  2  Ant:  3  Post:  2  CST:  negative  UVJ: +hypermobile    Impression/Plan:    ICD-10-CM    1. Uterovaginal prolapse, incomplete  N81.2       2. Postmenopausal atrophic vaginitis  N95.2           Discussion Items:   Behavioral and pharmacologic treatments for OAB  Nonsurgical and surgical treatments for POP  Surgical Discussion: We discussed the risks, benefits, goals and alternatives to surgical approaches for pelvic floor disorders including, but not limited to, bleeding, infection, pelvic organ damage, recurrent prolapse, recurrent stress incontinence, de katerina OAB, pain, sexual dysfunction, voiding dysfunction, long-term catheterization and re-operation.  Post-op restrictions and expectations reviewed.    Discussed management of pelvic organ prolapse including but not limited to behavioral modifications, conservative options, and surgical management.   Discussed pessary management including benefits and risks. Discussed importance of keeping regularly scheduled pessary checks in prevention of complications related to pessary use.     Discussed mgmt of vulvovaginal atrophy with vaginal estrogen cream. Reviewed associated benefits, risks, alternatives, and  You may take an over-the-counter probiotic such as Culturelle while on antibiotics.   goals. Recommend low dose twice weekly mgmt     Discussed dietary and behavioral modifications for mgmt of urinary symptoms  Discussed weight management and benefits of weight loss on urinary symptoms  Reviewed AUA/SUFU guidelines on mgmt of non-neurogenic OAB  Discussed pharmacologic and nonpharmacologic mgmt options of urinary symptoms - reviewed risks, benefits, alternatives, and goals of treatment  Discussed specific risks related to OAB meds including, but not limited to dry mouth, constipation, blurry vision, cognitive changes, and BP elevation.        Diagnostic Items:  Urodynamics    Medications Discussed:  Estrace Cream    Treatment Plan, Non-surgical:   RN teaching/pt education done  Estrace / Premarin cream    Treatment Plan, Surgical:   Discussed options TVH colpoclesis vs TVH USVVS APE repair, cysto    All questions answered  She understands and agrees to plan    Return for uds, f/u.    Elida Olson DO, FACOG, FACS    The 21st Century Cures Act makes medical notes like these available to patients in the interest of transparency. However, be advised this is a medical document. It is intended as peer to peer communication. It is written in medical language and may contain abbreviations or verbiage that are unfamiliar. It may appear blunt or direct. Medical documents are intended to carry relevant information, facts as evident, and the clinical opinion of the practitioner.

## 2025-07-29 ENCOUNTER — TELEPHONE (OUTPATIENT)
Dept: UROLOGY | Facility: CLINIC | Age: 80
End: 2025-07-29

## 2025-07-31 ENCOUNTER — OFFICE VISIT (OUTPATIENT)
Dept: UROLOGY | Facility: CLINIC | Age: 80
End: 2025-07-31
Attending: OBSTETRICS & GYNECOLOGY
Payer: MEDICARE

## 2025-07-31 VITALS
DIASTOLIC BLOOD PRESSURE: 78 MMHG | HEIGHT: 60.25 IN | TEMPERATURE: 98 F | SYSTOLIC BLOOD PRESSURE: 130 MMHG | BODY MASS INDEX: 19 KG/M2

## 2025-07-31 DIAGNOSIS — Z01.812 PRE-PROCEDURE LAB EXAM: ICD-10-CM

## 2025-07-31 DIAGNOSIS — N81.2 UTEROVAGINAL PROLAPSE, INCOMPLETE: Primary | ICD-10-CM

## 2025-07-31 LAB
BLOOD URINE: NEGATIVE
CONTROL RUN WITHIN 24 HOURS?: YES
LEUKOCYTE ESTERASE URINE: NEGATIVE
NITRITE URINE: NEGATIVE

## 2025-07-31 PROCEDURE — 51729 CYSTOMETROGRAM W/VP&UP: CPT

## 2025-07-31 PROCEDURE — 51741 ELECTRO-UROFLOWMETRY FIRST: CPT

## 2025-07-31 PROCEDURE — 81002 URINALYSIS NONAUTO W/O SCOPE: CPT

## 2025-07-31 PROCEDURE — 51797 INTRAABDOMINAL PRESSURE TEST: CPT

## 2025-07-31 PROCEDURE — 51784 ANAL/URINARY MUSCLE STUDY: CPT

## 2025-08-08 ENCOUNTER — VIRTUAL PHONE E/M (OUTPATIENT)
Dept: UROLOGY | Facility: CLINIC | Age: 80
End: 2025-08-08
Attending: OBSTETRICS & GYNECOLOGY

## 2025-08-08 ENCOUNTER — TELEPHONE (OUTPATIENT)
Dept: UROLOGY | Facility: CLINIC | Age: 80
End: 2025-08-08

## 2025-08-08 DIAGNOSIS — N95.2 POSTMENOPAUSAL ATROPHIC VAGINITIS: ICD-10-CM

## 2025-08-08 DIAGNOSIS — N81.2 UTEROVAGINAL PROLAPSE, INCOMPLETE: Primary | ICD-10-CM

## 2025-08-08 DIAGNOSIS — N32.81 DETRUSOR INSTABILITY: ICD-10-CM

## 2025-08-08 DIAGNOSIS — N39.3 FEMALE STRESS INCONTINENCE: ICD-10-CM

## 2025-08-14 ENCOUNTER — OFFICE VISIT (OUTPATIENT)
Dept: UROLOGY | Facility: CLINIC | Age: 80
End: 2025-08-14
Attending: OBSTETRICS & GYNECOLOGY

## 2025-08-14 VITALS — BODY MASS INDEX: 19 KG/M2 | HEIGHT: 60.25 IN | TEMPERATURE: 98 F | RESPIRATION RATE: 16 BRPM

## 2025-08-14 DIAGNOSIS — N81.2 UTEROVAGINAL PROLAPSE, INCOMPLETE: Primary | ICD-10-CM

## 2025-08-14 DIAGNOSIS — N81.84 PELVIC MUSCLE WASTING: ICD-10-CM

## 2025-08-14 DIAGNOSIS — N32.81 DETRUSOR INSTABILITY: ICD-10-CM

## 2025-08-14 DIAGNOSIS — N39.3 FEMALE STRESS INCONTINENCE: ICD-10-CM

## 2025-08-14 DIAGNOSIS — N95.2 POSTMENOPAUSAL ATROPHIC VAGINITIS: ICD-10-CM

## 2025-08-14 PROCEDURE — 99212 OFFICE O/P EST SF 10 MIN: CPT

## 2025-08-14 PROCEDURE — 57160 INSERT PESSARY/OTHER DEVICE: CPT

## (undated) NOTE — MR AVS SNAPSHOT
[facility]  [place],  [address] [place] [address] [phone]               Thank you for choosing us for your health care visit with Neisha Doan MD.  We are glad to serve you and happy to provide you with this summa Today's Vital Signs     BP Pulse Height Weight BMI    122/70 mmHg 80 60.75\" 111 lb 21.15 kg/m2         Current Medications          This list is accurate as of: 5/8/17  4:34 PM.  Always use your most recent med list.                Levothyroxine Sodium 50 Dietary sodium reduction Reduce dietary sodium intake to <= 100 mmol per day (2.4 g sodium or 6 g sodium chloride)   Aerobic physical activity Regular aerobic physical activity (e.g., brisk walking, light jogging, cycling, swimming, etc.) for a goal of at

## (undated) NOTE — LETTER
[facility] HEALTH MANAGEMENT DEPARTMENT  [address] [address]  PH: [phone]  FAX: [phone]        05/15/25  Nilam Lantigua, :  1945  [address] [address]    Nilam Lantigua  [address] [address]      Dear Nilam:    You had previously enrolled in our Chronic Care Management program and had been speaking with your Health .   Currently, we are graduating patients who have been maintaining health. We are so proud of your dedication and hard work in achieving your health goals!   There are no enrollment or cancellation fees.    If you have any questions or concerns, or if you find that you need additional support, please call your Primary Care Doctor.     It was a pleasure working with you,    [facility]  Care Management Department

## (undated) NOTE — ED AVS SNAPSHOT
THE [facility] [place] [address] [address]    Phone:  [phone]    Fax:  [phone]   MRN: [id number]    Department:  THE [facility] in [place]   Date of Visit:  4/26/2017           Price Icing at least 3 times per day   Slow neck exercises / stretches discussed       Discharge References/Attachments     STRAINS AND SPRAINS, SELF-CARE FOR (ENGLISH)    HEAD INJURIES: FIRST AID (ENGLISH)      Disclosure     Insurance plans vary and the phys Immediate Cares. Follow-up care is at the discretion of that Physician. IF THERE IS ANY CHANGE OR WORSENING OF YOUR CONDITION, CALL YOUR PRIMARY CARE PHYSICIAN AT ONCE OR GO TO THE EMERGENCY DEPARTMENT.     If you have been prescribed any medication(s), - If you don’t have insurance, Vickie Donnelly has partnered with [employer] to help you get signed up for insurance coverage.   [employer] is a Federal Navigator program that can help with your Affordable Care Act cover

## (undated) NOTE — Clinical Note
Reynaldo Gar - I saw Bonifacio Johnson today with bulge. I've recommended pelvic floor PT. I will work to manage her sx. I appreciate the opportunity to participate in her care.  Thanks, [employer]